# Patient Record
Sex: MALE | Race: WHITE | NOT HISPANIC OR LATINO | Employment: PART TIME | URBAN - METROPOLITAN AREA
[De-identification: names, ages, dates, MRNs, and addresses within clinical notes are randomized per-mention and may not be internally consistent; named-entity substitution may affect disease eponyms.]

---

## 2018-02-14 ENCOUNTER — OFFICE VISIT (OUTPATIENT)
Dept: UROLOGY | Facility: CLINIC | Age: 73
End: 2018-02-14
Payer: COMMERCIAL

## 2018-02-14 VITALS
HEIGHT: 69 IN | BODY MASS INDEX: 27.55 KG/M2 | DIASTOLIC BLOOD PRESSURE: 76 MMHG | WEIGHT: 186 LBS | SYSTOLIC BLOOD PRESSURE: 112 MMHG | HEART RATE: 78 BPM

## 2018-02-14 DIAGNOSIS — N52.9 ERECTILE DYSFUNCTION, UNSPECIFIED ERECTILE DYSFUNCTION TYPE: Primary | ICD-10-CM

## 2018-02-14 DIAGNOSIS — N40.0 BENIGN PROSTATIC HYPERPLASIA, UNSPECIFIED WHETHER LOWER URINARY TRACT SYMPTOMS PRESENT: ICD-10-CM

## 2018-02-14 LAB
POST-VOID RESIDUAL VOLUME, ML POC: 12 ML
SL AMB  POCT GLUCOSE, UA: NORMAL
SL AMB LEUKOCYTE ESTERASE,UA: NORMAL
SL AMB POCT BLOOD,UA: NORMAL
SL AMB POCT CLARITY,UA: CLEAR
SL AMB POCT COLOR,UA: YELLOW
SL AMB POCT KETONES,UA: NORMAL
SL AMB POCT NITRITE,UA: NORMAL
SL AMB POCT PH,UA: 7
SL AMB POCT URINE PROTEIN: NORMAL

## 2018-02-14 PROCEDURE — 81002 URINALYSIS NONAUTO W/O SCOPE: CPT | Performed by: PHYSICIAN ASSISTANT

## 2018-02-14 PROCEDURE — 99203 OFFICE O/P NEW LOW 30 MIN: CPT | Performed by: PHYSICIAN ASSISTANT

## 2018-02-14 RX ORDER — MAG HYDROX/ALUMINUM HYD/SIMETH 400-400-40
450 SUSPENSION, ORAL (FINAL DOSE FORM) ORAL DAILY
COMMUNITY
End: 2019-02-26

## 2018-02-14 RX ORDER — IBUPROFEN 800 MG/1
1 TABLET ORAL AS NEEDED
COMMUNITY
Start: 2016-04-19 | End: 2019-08-14 | Stop reason: ALTCHOICE

## 2018-02-14 RX ORDER — SIMVASTATIN 40 MG
TABLET ORAL DAILY
COMMUNITY
Start: 2018-02-06 | End: 2019-03-05 | Stop reason: SDUPTHER

## 2018-02-14 RX ORDER — PERINDOPRIL ERBUMINE 4 MG/1
2 TABLET ORAL AS NEEDED
COMMUNITY
Start: 2015-10-12 | End: 2019-02-26

## 2018-02-14 RX ORDER — NAPROXEN 250 MG/1
200 TABLET ORAL DAILY
COMMUNITY
End: 2020-05-21

## 2018-02-14 NOTE — PROGRESS NOTES
UROLOGY NEW PATIENT NOTE   Admission Date: (Not on file)    Patient Identifiers: Yoana Merino (MRN: [id number])  Urology, Jorden Bourne PA-C  Consults  Date of Service: 2/14/2018      History of Present Illness:     Yoana Merino is a 71 y o  old with a history of erectile dysfunction  He has been using Viagra for several years successfully  He only used 1/2 of 100 mg  More recently he began having trouble achieving and maintaining an erection  Urine is clear  No voiding complaints  No prior urologic history  No family history  Bladder PVR 12ml  Past Medical, Past Surgical History:     Past Medical History:   Diagnosis Date    High cholesterol    :    Past Surgical History:   Procedure Laterality Date    BACK SURGERY      INCISION AND DRAINAGE ABSCESS / HEMATOMA OF BURSA / KNEE / THIGH      SHOULDER SURGERY Right    :    Medications, Allergies:     Current Outpatient Prescriptions:     aspirin 81 MG tablet, Take 81 mg by mouth 3 (three) times a week, Disp: , Rfl:     ibuprofen (MOTRIN) 800 mg tablet, Take 1 tablet by mouth every 6 (six) hours, Disp: , Rfl:     naproxen (NAPROSYN) 250 mg tablet, Take 200 mg by mouth daily, Disp: , Rfl:     perindopril (ACEON) 4 MG tablet, Take 2 mg by mouth as needed, Disp: , Rfl:     Saw [place] 450 MG CAPS, Take 450 mg by mouth daily, Disp: , Rfl:     simvastatin (ZOCOR) 40 mg tablet, daily, Disp: , Rfl:     VITAMIN E PO, Take 500 Units by mouth, Disp: , Rfl:     Allergies:  No Known Allergies:    Social and Family History:   Social History:   Social History   Substance Use Topics    Smoking status: Former Smoker    Smokeless tobacco: Never Used    Alcohol use Yes      Comment: 5 Drinks a week         History   Smoking Status    Former Smoker   Smokeless Tobacco    Never Used       Family History:  Family History   Problem Relation Age of Onset    Heart disease Mother     Heart disease Father    :     Review of Systems:     General: Fever, chills, or night sweats: negative  Cardiac: Negative for chest pain  Pulmonary: Negative for shortness of breath  Gastrointestinal: Abdominal pain negative  Nausea, vomiting, or diarrhea negative,  Genitourinary: See HPI above  Patient does not have hematuria  All other systems queried were negative  Physical Exam:   General: Patient is pleasant and in NAD  Awake and alert  /76 (BP Location: Right arm, Patient Position: Sitting, Cuff Size: Adult)   Pulse 78   Ht 5' 9" (1 753 m)   Wt 84 4 kg (186 lb)   BMI 27 47 kg/m²   Cardiac: Peripheral edema: negative  Pulmonary: Non-labored breathing  Abdomen: Soft, non-tender, non-distended  No surgical scars  No masses, tenderness, hernias noted  Genitourinary: Negative CVA tenderness, negative suprapubic tenderness  (Male): Penis circumcised, phallus normal, meatus patent  Testicles descended into scrotum bilaterally the left testicle is atrophic without masses nor tenderness  No inguinal hernias bilaterally  RADHA: Prostate is enlarged at 40 grams  Smooth without nodules      Labs:   No results found for: HGB, HCT, WBC, PLT]    No results found for: NA, K, CL, CO2, BUN, CREATININE, CALCIUM, GLUCOSE]    Imaging:   I personally reviewed the images and report of the following studies, and reviewed them with the patient: None    ASSESSMENT:     1  Erectile dysfunction  2  BPH      PLAN:   - try 100 mg viagra  - check testosterone and PSA  - follow up in 3 weeks    Thank you for allowing me to participate in this patients care  Please do not hesitate to call with any additional questions    Judith Mena PA-C

## 2018-02-28 ENCOUNTER — OFFICE VISIT (OUTPATIENT)
Dept: UROLOGY | Facility: CLINIC | Age: 73
End: 2018-02-28
Payer: COMMERCIAL

## 2018-02-28 VITALS
BODY MASS INDEX: 27.55 KG/M2 | DIASTOLIC BLOOD PRESSURE: 75 MMHG | HEIGHT: 69 IN | WEIGHT: 186 LBS | SYSTOLIC BLOOD PRESSURE: 117 MMHG

## 2018-02-28 DIAGNOSIS — N40.1 BPH WITH OBSTRUCTION/LOWER URINARY TRACT SYMPTOMS: ICD-10-CM

## 2018-02-28 DIAGNOSIS — N13.8 BPH WITH OBSTRUCTION/LOWER URINARY TRACT SYMPTOMS: ICD-10-CM

## 2018-02-28 DIAGNOSIS — N52.9 ERECTILE DYSFUNCTION, UNSPECIFIED ERECTILE DYSFUNCTION TYPE: Primary | ICD-10-CM

## 2018-02-28 LAB
SL AMB  POCT GLUCOSE, UA: NORMAL
SL AMB LEUKOCYTE ESTERASE,UA: NORMAL
SL AMB POCT BLOOD,UA: NORMAL
SL AMB POCT CLARITY,UA: CLEAR
SL AMB POCT COLOR,UA: YELLOW
SL AMB POCT KETONES,UA: NORMAL
SL AMB POCT NITRITE,UA: NORMAL
SL AMB POCT PH,UA: 5
SL AMB POCT URINE PROTEIN: NORMAL

## 2018-02-28 PROCEDURE — 81002 URINALYSIS NONAUTO W/O SCOPE: CPT | Performed by: PHYSICIAN ASSISTANT

## 2018-02-28 PROCEDURE — 99213 OFFICE O/P EST LOW 20 MIN: CPT | Performed by: PHYSICIAN ASSISTANT

## 2018-02-28 NOTE — PROGRESS NOTES
UROLOGY PROGRESS NOTE   Patient Identifiers: Abhay Stewart (MRN [id number])  Date of Service: 2/28/2018    Subjective:   71year old male hx of BPH and ED  He uses prn Viagra  PSA 1 29  Testosterone 510  Urine clear  Patient has  no complaints  Objective:     VITALS:    Vitals:    02/28/18 1441   BP: 117/75           LABS:  No results found for: HGB, HCT, WBC, PLT]    No results found for: NA, K, CL, CO2, BUN, CREATININE, CALCIUM, GLUCOSE]    INPATIENT MEDS:    Current Outpatient Prescriptions:     aspirin 81 MG tablet, Take 81 mg by mouth 3 (three) times a week, Disp: , Rfl:     ibuprofen (MOTRIN) 800 mg tablet, Take 1 tablet by mouth every 6 (six) hours, Disp: , Rfl:     naproxen (NAPROSYN) 250 mg tablet, Take 200 mg by mouth daily, Disp: , Rfl:     Saw [place] 450 MG CAPS, Take 450 mg by mouth daily, Disp: , Rfl:     simvastatin (ZOCOR) 40 mg tablet, daily, Disp: , Rfl:     VITAMIN E PO, Take 500 Units by mouth, Disp: , Rfl:     perindopril (ACEON) 4 MG tablet, Take 2 mg by mouth as needed, Disp: , Rfl:       Physical Exam:   /75 (BP Location: Left arm, Patient Position: Sitting, Cuff Size: Adult)   Ht 5' 9" (1 753 m)   Wt 84 4 kg (186 lb)   BMI 27 47 kg/m²   GEN: no acute distress    RESP: breathing comfortably with no accessory muscle use    ABD: soft, non-tender, non-distended    EXT: no significant peripheral edema     RADIOLOGY:   none     Assessment:   1   BPH  2  ED     Plan:   - one year psa prior  - prn Viagra  -  -  -

## 2019-01-18 ENCOUNTER — OFFICE VISIT (OUTPATIENT)
Dept: UROLOGY | Facility: CLINIC | Age: 74
End: 2019-01-18
Payer: COMMERCIAL

## 2019-01-18 VITALS
BODY MASS INDEX: 27.4 KG/M2 | HEIGHT: 69 IN | DIASTOLIC BLOOD PRESSURE: 64 MMHG | WEIGHT: 185 LBS | SYSTOLIC BLOOD PRESSURE: 122 MMHG | HEART RATE: 104 BPM

## 2019-01-18 DIAGNOSIS — N52.9 ERECTILE DYSFUNCTION, UNSPECIFIED ERECTILE DYSFUNCTION TYPE: Primary | ICD-10-CM

## 2019-01-18 PROCEDURE — 99213 OFFICE O/P EST LOW 20 MIN: CPT | Performed by: PHYSICIAN ASSISTANT

## 2019-01-18 RX ORDER — EZETIMIBE AND SIMVASTATIN 10; 40 MG/1; MG/1
1 TABLET ORAL EVERY OTHER DAY
COMMUNITY
End: 2021-01-07

## 2019-01-18 RX ORDER — SILDENAFIL 100 MG/1
100 TABLET, FILM COATED ORAL DAILY PRN
Qty: 10 TABLET | Refills: 0 | Status: SHIPPED | OUTPATIENT
Start: 2019-01-18

## 2019-01-18 NOTE — PROGRESS NOTES
UROLOGY PROGRESS NOTE   Patient Identifiers: Bri Rodriguez (MRN [id number])  Date of Service: 1/18/2019    Subjective:     80-year-old man with a history of erectile dysfunction  He has been using Viagra for several years successfully  His testosterone when checked was over 500  He was due for follow-up appointment in February but called come in sooner  He states the Viagra has stopped working for the last 2 months  He is buying a generic version online  He has not started any new medications  He does not smoke  He has not been tested for sleep apnea      Patient has    See above      Objective:     VITALS:    Vitals:    01/18/19 1433   BP: 122/64   Pulse: 104           LABS:  No results found for: HGB, HCT, WBC, PLT]    No results found for: NA, K, CL, CO2, BUN, CREATININE, CALCIUM, GLUCOSE]        INPATIENT MEDS:    Current Outpatient Prescriptions:     aspirin 81 MG tablet, Take 81 mg by mouth 3 (three) times a week, Disp: , Rfl:     ezetimibe-simvastatin (VYTORIN) 10-40 mg per tablet, Take 1 tablet by mouth, Disp: , Rfl:     ibuprofen (MOTRIN) 800 mg tablet, Take 1 tablet by mouth every 6 (six) hours, Disp: , Rfl:     perindopril (ACEON) 4 MG tablet, Take 2 mg by mouth as needed, Disp: , Rfl:     VITAMIN E PO, Take 500 Units by mouth, Disp: , Rfl:     naproxen (NAPROSYN) 250 mg tablet, Take 200 mg by mouth daily, Disp: , Rfl:     Saw [place] 450 MG CAPS, Take 450 mg by mouth daily, Disp: , Rfl:     sildenafil (VIAGRA) 100 mg tablet, Take 1 tablet (100 mg total) by mouth daily as needed for erectile dysfunction, Disp: 10 tablet, Rfl: 0    simvastatin (ZOCOR) 40 mg tablet, daily, Disp: , Rfl:       Physical Exam:   /64 (BP Location: Left arm, Patient Position: Sitting, Cuff Size: Adult)   Pulse 104   Ht 5' 9" (1 753 m)   Wt 83 9 kg (185 lb)   BMI 27 32 kg/m²   GEN: no acute distress    RESP: breathing comfortably with no accessory muscle use    ABD: soft, non-tender, non-distended INCISION:    EXT: no significant peripheral edema       RADIOLOGY:    none     Assessment:    1  Erectile dysfunction   2   BPH     Plan:   -  I recommended he consider getting a sleep study  To be arranged by his family doctor  - I gave him a prescription for brand name Viagra 100 mg and suggested he try at least 1 for comparison  - he has an appointment at the end of February which he will keep with a PSA prior to visit  -

## 2019-02-26 ENCOUNTER — HOSPITAL ENCOUNTER (OUTPATIENT)
Dept: RADIOLOGY | Facility: HOSPITAL | Age: 74
Discharge: HOME | End: 2019-02-26
Payer: COMMERCIAL

## 2019-02-26 VITALS
HEIGHT: 69 IN | HEART RATE: 85 BPM | WEIGHT: 186 LBS | SYSTOLIC BLOOD PRESSURE: 125 MMHG | DIASTOLIC BLOOD PRESSURE: 85 MMHG | BODY MASS INDEX: 27.55 KG/M2

## 2019-02-26 DIAGNOSIS — S82.034A CLOSED NONDISPLACED TRANSVERSE FRACTURE OF RIGHT PATELLA, INITIAL ENCOUNTER: Primary | ICD-10-CM

## 2019-02-26 DIAGNOSIS — M25.561 ACUTE PAIN OF RIGHT KNEE: ICD-10-CM

## 2019-02-26 PROCEDURE — 99203 OFFICE O/P NEW LOW 30 MIN: CPT | Performed by: PHYSICIAN ASSISTANT

## 2019-02-26 PROCEDURE — 73562 X-RAY EXAM OF KNEE 3: CPT

## 2019-02-26 RX ORDER — MULTIVIT-MIN/IRON/FOLIC ACID/K 18-600-40
CAPSULE ORAL DAILY PRN
COMMUNITY

## 2019-02-26 RX ORDER — PHENOL 1.4 %
600 AEROSOL, SPRAY (ML) MUCOUS MEMBRANE 2 TIMES DAILY WITH MEALS
COMMUNITY
End: 2020-04-09

## 2019-02-27 NOTE — PROGRESS NOTES
Assessment/Plan   Diagnoses and all orders for this visit:    Acute pain of right knee  -     XR knee 3 vw right non injury; Future    Patella fracture, non-displaced       -     Immobilizer fitted and dispensed today       -     Wear at all times       -     Crutches refused       -     Ice, NSAIDs       -     Continue daily baby aspirin       -     Follow up with sports medicine in 1 week  Subjective   Patient ID: Hernan Short is a 68 y o  male  Vitals:    02/26/19 1903   BP: 125/85   Pulse: 80     72yo male comes in for an evaluation of his right knee  He was injured when he fell on the ice a week ago  He struck his knee directly on the ground  Since then, he has been having pain in the superior patellar area  His pain is mild and he has been walking on it  The pain is dull in character, mild in severity, pain does not radiate and is not associated with numbness  The following portions of the patient's history were reviewed and updated as appropriate: allergies, current medications, past family history, past medical history, past social history, past surgical history and problem list     Review of Systems  Ortho Exam  Past Medical History:   Diagnosis Date    High cholesterol      Past Surgical History:   Procedure Laterality Date    BACK SURGERY      INCISION AND DRAINAGE ABSCESS / HEMATOMA OF BURSA / [place] / [place] Right      Family History   Problem Relation Age of Onset    Heart disease Mother     Heart disease Father      Social History     Occupational History    Occupation:    Tobacco Use    Smoking status: Former Smoker    Smokeless tobacco: Never Used   Substance and Sexual Activity    Alcohol use: Yes     Comment: 5 Drinks a week    Drug use: No    Sexual activity: Not on file       Review of Systems   Constitutional: Negative  HENT: Negative  Eyes: Negative  Respiratory: Negative  Cardiovascular: Negative      Gastrointestinal: Negative  Endocrine: Negative  Genitourinary: Negative  Musculoskeletal: As below      Allergic/Immunologic: Negative  Neurological: Negative  Hematological: Negative  Psychiatric/Behavioral: Negative  Objective   Physical Exam    · Constitutional: Awake, Alert, Oriented  · Eyes: EOMI  · Psych: Mood and affect appropriate  · Heart: regular rate and rhythm  · Lungs: No audible wheezing  · Abdomen: soft  · Lymph: no lymphedema   right Knee:  - Appearance   Swelling: mild anterior, no discoloration, no deformity, no ecchymosis and no erythema  - Effusion   trace  - Palpation   + superior Patella tenderness  No tenderness about the medial / lateral joint line, patellar tendon, MCL, LCL, hamstrings, or medial / lateral tibial plateau   - ROM   Extension: 0 and Flexion: 90  - Special Tests   He is able to perform a straight leg raise against gravity  - Motor   limited by pain  - NVI distally    I have personally reviewed pertinent films in PACS and my interpretation is Nondisplaced, horizontal fracture at the superior pole of the patella

## 2019-03-05 ENCOUNTER — APPOINTMENT (OUTPATIENT)
Dept: RADIOLOGY | Facility: OTHER | Age: 74
End: 2019-03-05
Payer: COMMERCIAL

## 2019-03-05 VITALS
HEIGHT: 69 IN | DIASTOLIC BLOOD PRESSURE: 86 MMHG | SYSTOLIC BLOOD PRESSURE: 118 MMHG | HEART RATE: 70 BPM | BODY MASS INDEX: 27.47 KG/M2

## 2019-03-05 DIAGNOSIS — T14.8XXA FRACTURE: Primary | ICD-10-CM

## 2019-03-05 DIAGNOSIS — S82.034A CLOSED NONDISPLACED TRANSVERSE FRACTURE OF RIGHT PATELLA, INITIAL ENCOUNTER: ICD-10-CM

## 2019-03-05 DIAGNOSIS — T14.8XXA FRACTURE: ICD-10-CM

## 2019-03-05 PROCEDURE — 73564 X-RAY EXAM KNEE 4 OR MORE: CPT

## 2019-03-05 PROCEDURE — 99213 OFFICE O/P EST LOW 20 MIN: CPT | Performed by: FAMILY MEDICINE

## 2019-03-05 NOTE — PATIENT INSTRUCTIONS
Patient's history, exam, and radiographic findings are consistent with nondisplaced transverse upper pole patellar fracture  Patient is just under 2 weeks out of injury  At this time, advised patient to continue to wear his extension immobilizer brace until next evaluation, but may weight bear as tolerated  At next visit in 1 week, will repeat knee xray to evaluate for potential displacement  If patient is further improved at this time, may consider transitioning to a range of motion knee brace  Advised against bending knee until clearance, possibly at next visit  Explained that we don't want to rehab too soon to avoid further injury  Patient voiced understanding and agreement to above plan; no further questions or concerns voiced

## 2019-03-05 NOTE — PROGRESS NOTES
1  Fracture  XR knee 4+ vw right injury   2  Closed nondisplaced transverse fracture of right patella, initial encounter       Orders Placed This Encounter   Procedures    XR knee 4+ vw right injury        Imaging Studies (I personally reviewed images in PACS and report):  3/5/19 right knee xray: previously described nondisplaced transverse upper pole patellar fracture without new displacement    Past diagnostics:  2/26/19 right knee xray: acute nondisplaced transverse upper pole patellar fracture    IMPRESSION:  Nondisplaced transverse upper pole patellar fracture  Date of injury: 2/21/19  Initial evaluation of sports medicine: 3/5/19  Date of immobilization: 2/26/19  Follow up since injury: 1 week 5 days      Return in about 1 week (around 3/12/2019) for repeat xrays at this time  Patient Instructions   Patient's history, exam, and radiographic findings are consistent with nondisplaced transverse upper pole patellar fracture  Patient is just under 2 weeks out of injury  At this time, advised patient to continue to wear his extension immobilizer brace until next evaluation, but may weight bear as tolerated  At next visit in 1 week, will repeat knee xray to evaluate for potential displacement  If patient is further improved at this time, may consider transitioning to a range of motion knee brace  Advised against bending knee until clearance, possibly at next visit  Explained that we don't want to rehab too soon to avoid further injury  Patient voiced understanding and agreement to above plan; no further questions or concerns voiced  CHIEF COMPLAINT:  Right knee pain  Nondisplaced transverse upper pole patellar fracture    HPI:  Karen Almodovar is a 68 y o  male  who presents for       Visit 3/5/19  Patient is a pleasant 67 yo M presenting for initial evaluation of right knee pain s/p direct impact fall on the ice approx 2/19/19   Patient was evaluated by Reji Mistry 2/26/19 and diagnosed with nondisplaced transverse upper pole patellar fracture  He was placed in a knee immobilizer and instructed to be non weight bearing using crutches  Today, patient notes he has been using the knee immobilizer as instructed, but has been weight bearing with the use of a cane  Notes  1/10 pain with standing, 3-4/10 with walking, and 6/10 with going up steps  Describes pain as pressure, soreness over anterior aspect of knee  Denies numbness, tingling, weakness, or new falls  History of right bursa removed about 15 years ago      Review of Systems   Constitutional: Negative for chills and fever  HENT: Negative for congestion, postnasal drip and sore throat  Respiratory: Negative for cough, choking, shortness of breath and wheezing  Cardiovascular: Negative for chest pain and palpitations  Gastrointestinal: Negative for abdominal pain, diarrhea, nausea and vomiting  Genitourinary: Negative for decreased urine volume and difficulty urinating  Neurological: Negative for dizziness and light-headedness  Following history reviewed and update:    Past Medical History:   Diagnosis Date    High cholesterol      Past Surgical History:   Procedure Laterality Date    BACK SURGERY      HERNIA REPAIR      INCISION AND DRAINAGE ABSCESS / HEMATOMA OF BURSA / KNEE / THIGH      SHOULDER SURGERY Right      Social History   Social History     Substance and Sexual Activity   Alcohol Use Yes    Comment: 5 Drinks a week     Social History     Substance and Sexual Activity   Drug Use No     Social History     Tobacco Use   Smoking Status Former Smoker   Smokeless Tobacco Never Used     Family History   Problem Relation Age of Onset    Heart disease Mother     Heart disease Father     Heart disease Brother      Allergies   Allergen Reactions    Cephalosporins      Other reaction(s):  Other (See Comments)  rash          Physical Exam  /86   Pulse 70   Ht 5' 9" (1 753 m)   BMI 27 47 kg/m² Constitutional:  see vital signs  Gen: well-developed, normocephalic/atraumatic, well-groomed  Eyes: No inflammation or discharge of conjunctiva or lids; sclera clear   Pharynx: no inflammation, lesion, or mass of lips  Neck: supple, no masses, non-distended  MSK: no inflammation, lesion, mass, or clubbing of nails and digits except for other than mentioned below  SKIN: no visible rashes or skin lesions  Pulmonary/Chest: Effort normal  No respiratory distress     NEURO: cranial nerves grossly intact  PSYCH:  Alert and oriented to person, place, and time; recent and remote memory intact; mood normal, no depression, anxiety, or agitation, judgment and insight good and intact     Ortho Exam  RIGHT KNEE:  Erythema: no  Swelling: no  Increased Warmth: no  Tenderness: mild over proximal patella  Flexion: intact  Extension: intact  Patellar Apprehension: negative  Patellar Grind Crain's: negative  Lachman's: negative  Drawer: negative  Varus laxity: negative  Valgus laxity: negative  Didier: negative     Procedures

## 2019-03-11 NOTE — TELEPHONE ENCOUNTER
Call from patient  Phone# [phone]  Flex    Pt called to change his appointment to another day after 4:30pm  His wife gets out of work at 4:15p  Pt has no other way to get to appointment  Emailed Harinder Souza to schedule Lyft ride, pt agreed  Needs [place] where he can fully extend his leg

## 2019-03-14 ENCOUNTER — OFFICE VISIT (OUTPATIENT)
Dept: OBGYN CLINIC | Facility: OTHER | Age: 74
End: 2019-03-14
Payer: COMMERCIAL

## 2019-03-14 ENCOUNTER — APPOINTMENT (OUTPATIENT)
Dept: RADIOLOGY | Facility: OTHER | Age: 74
End: 2019-03-14
Payer: COMMERCIAL

## 2019-03-14 VITALS
BODY MASS INDEX: 26.66 KG/M2 | HEART RATE: 81 BPM | SYSTOLIC BLOOD PRESSURE: 126 MMHG | DIASTOLIC BLOOD PRESSURE: 85 MMHG | HEIGHT: 69 IN | WEIGHT: 180 LBS

## 2019-03-14 DIAGNOSIS — S82.034D CLOSED NONDISPLACED TRANSVERSE FRACTURE OF RIGHT PATELLA WITH ROUTINE HEALING, SUBSEQUENT ENCOUNTER: Primary | ICD-10-CM

## 2019-03-14 DIAGNOSIS — T14.8XXA FRACTURE: ICD-10-CM

## 2019-03-14 PROCEDURE — 99213 OFFICE O/P EST LOW 20 MIN: CPT | Performed by: FAMILY MEDICINE

## 2019-03-14 PROCEDURE — 73564 X-RAY EXAM KNEE 4 OR MORE: CPT

## 2019-03-14 NOTE — PATIENT INSTRUCTIONS
Instructed patient that he will be transitioned into a TROM brace today and no flexion past 20 degrees in brace  He will progressively be able to flex his knee after his next follow up visit and repeat x rays to evaluate potential displacement of the fracture  Recommended against driving at this time  Patient was in agreement with this treatment plan and all questions were answered  I recommend vitamin D 800-1000 IUs (international units) and Calcium 1500mg per day to help promote fracture healing  Calcium pills can lead to constipation and I recommend increasing calcium through the diet via dairy such as yogurt, cheese, or milk as tolerated  Vitamin D is usually taken by mouth in pill form over the counter  Sometimes vitamin D levels are too low and physicians may consider checking a blood test to see if you require extra Vitamin D for catch-up dosing if you are already low

## 2019-03-14 NOTE — PROGRESS NOTES
1  Closed nondisplaced transverse fracture of right patella with routine healing, subsequent encounter  T-Rom  Post Op Knee Brace   2  Fracture  XR knee 4+ vw right injury     Orders Placed This Encounter   Procedures    T-Rom  Post Op Knee Brace    XR knee 4+ vw right injury        Imaging Studies (I personally reviewed images in PACS and report):  X-ray right knee 3/14/19: Nondisplaced transverse upper pole patellar fracture without further displacement    Past diagnostics:  2/26/19 right knee xray: acute nondisplaced transverse upper pole patellar fracture  3/5/19 right knee xray: previously described nondisplaced transverse upper pole patellar fracture without new displacement    IMPRESSION:  Nondisplaced transverse upper pole patellar fracture  Date of injury: 2/21/19  Initial evaluation of sports medicine: 3/5/19  Date of immobilization: 2/26/19  Follow up since injury: 3 weeks      Return in about 1 week (around 3/21/2019) for Recheck of right knee with repeat x rays at this time  Patient Instructions   Instructed patient that he will be transitioned into a TROM brace today and no flexion past 20 degrees in brace  He will progressively be able to flex his knee after his next follow up visit and repeat x rays to evaluate potential displacement of the fracture  Recommended against driving at this time  Patient was in agreement with this treatment plan and all questions were answered  I recommend vitamin D 800-1000 IUs (international units) and Calcium 1500mg per day to help promote fracture healing  Calcium pills can lead to constipation and I recommend increasing calcium through the diet via dairy such as yogurt, cheese, or milk as tolerated  Vitamin D is usually taken by mouth in pill form over the counter  Sometimes vitamin D levels are too low and physicians may consider checking a blood test to see if you require extra Vitamin D for catch-up dosing if you are already low                CHIEF COMPLAINT:  Follow-up Nondisplaced transverse upper pole patellar fracture    HPI:  Delores Martinez is a 68 y o  male  who presents for       Visit 3/5/19  Patient is a pleasant 67 yo M presenting for initial evaluation of right knee pain s/p direct impact fall on the ice approx 2/19/19  Patient was evaluated by Zechariah Whyte 2/26/19 and diagnosed with nondisplaced transverse upper pole patellar fracture  He was placed in a knee immobilizer and instructed to be non weight bearing using crutches  Today, patient notes he has been using the knee immobilizer as instructed, but has been weight bearing with the use of a cane  Notes  1/10 pain with standing, 3-4/10 with walking, and 6/10 with going up steps  Describes pain as pressure, soreness over anterior aspect of knee  Denies numbness, tingling, weakness, or new falls  History of right bursa removed about 15 years ago    Visit 3/14/19  69 y/o male who presents today for a follow up visit for his right knee  Patient has been treating conservatively for a nondisplaced patellar fracture  Today, patient reports that he is doing about 75% better in terms of his pain/symptoms  He has been complaint with no flexion of his knee and the use of axillary crutches  He continues to note mild tenderness, "soreness" over the anterior aspect of his knee  He has removed his straight leg knee brace occasionally at home and is able to fully extend his leg without any difficulty  He does avoid flexing his knee during this time  Denies numbness and tingling, fevers or chills  Review of Systems   Constitutional: Negative for chills and fever  HENT: Negative for congestion, hearing loss, postnasal drip and sore throat  Eyes: Negative for visual disturbance  Respiratory: Negative for cough, choking, shortness of breath and wheezing  Cardiovascular: Negative for chest pain and palpitations  Gastrointestinal: Negative for abdominal pain, diarrhea, nausea and vomiting  Genitourinary: Negative for decreased urine volume, difficulty urinating and dysuria  Neurological: Negative for dizziness, weakness, light-headedness and numbness  Psychiatric/Behavioral: Negative for suicidal ideas  Following history reviewed and update:    Past Medical History:   Diagnosis Date    High cholesterol      Past Surgical History:   Procedure Laterality Date    BACK SURGERY      HERNIA REPAIR      INCISION AND DRAINAGE ABSCESS / HEMATOMA OF BURSA / KNEE / THIGH      SHOULDER SURGERY Right      Social History   Social History     Substance and Sexual Activity   Alcohol Use Yes    Comment: 5 Drinks a week     Social History     Substance and Sexual Activity   Drug Use No     Social History     Tobacco Use   Smoking Status Former Smoker   Smokeless Tobacco Never Used     Family History   Problem Relation Age of Onset    Heart disease Mother     Heart disease Father     Heart disease Brother      Allergies   Allergen Reactions    Cephalosporins      Rash  *PT STATES THAT HE DOES NOT HAVE ANY ALLERGIES          Physical Exam  /85   Pulse 81   Ht 5' 9" (1 753 m)   Wt 81 6 kg (180 lb) Comment: verbal, scale is broken  BMI 26 58 kg/m²     Constitutional:  see vital signs  Gen: well-developed, normocephalic/atraumatic, well-groomed  Eyes: No inflammation or discharge of conjunctiva or lids; sclera clear   Pharynx: no inflammation, lesion, or mass of lips  Neck: supple, no masses, non-distended  MSK: no inflammation, lesion, mass, or clubbing of nails and digits except for other than mentioned below  SKIN: no visible rashes or skin lesions  Pulmonary/Chest: Effort normal  No respiratory distress     NEURO: cranial nerves grossly intact  PSYCH:  Alert and oriented to person, place, and time; recent and remote memory intact; mood normal, no depression, anxiety, or agitation, judgment and insight good and intact     Ortho Exam  RIGHT KNEE:  Erythema: no  Swelling: no  Increased Warmth: no  Tenderness: +mild over proximal patella  Flexion:  Active 20° stiffness  Extension:   Extensor mechanism intact  No deficit    Patellar Apprehension: negative  Patellar Grind Crain's: negative  Lachman's: negative  Drawer: negative  Varus laxity: negative  Valgus laxity: negative    Procedures        Scribe Attestation    I,:   Duke Tena am acting as a scribe while in the presence of the attending physician :        I,:   Alida Viera DO personally performed the services described in this documentation    as scribed in my presence :

## 2019-03-21 ENCOUNTER — OFFICE VISIT (OUTPATIENT)
Dept: OBGYN CLINIC | Facility: OTHER | Age: 74
End: 2019-03-21
Payer: COMMERCIAL

## 2019-03-21 ENCOUNTER — APPOINTMENT (OUTPATIENT)
Dept: RADIOLOGY | Facility: OTHER | Age: 74
End: 2019-03-21
Payer: COMMERCIAL

## 2019-03-21 VITALS
BODY MASS INDEX: 26.58 KG/M2 | SYSTOLIC BLOOD PRESSURE: 115 MMHG | DIASTOLIC BLOOD PRESSURE: 77 MMHG | HEIGHT: 69 IN | HEART RATE: 62 BPM

## 2019-03-21 DIAGNOSIS — T14.8XXA FRACTURE: ICD-10-CM

## 2019-03-21 DIAGNOSIS — S82.034D CLOSED NONDISPLACED TRANSVERSE FRACTURE OF RIGHT PATELLA WITH ROUTINE HEALING, SUBSEQUENT ENCOUNTER: Primary | ICD-10-CM

## 2019-03-21 PROCEDURE — 73564 X-RAY EXAM KNEE 4 OR MORE: CPT

## 2019-03-21 PROCEDURE — 99213 OFFICE O/P EST LOW 20 MIN: CPT | Performed by: FAMILY MEDICINE

## 2019-03-21 NOTE — PROGRESS NOTES
1  Closed nondisplaced transverse fracture of right patella with routine healing, subsequent encounter  [facility]   2  Fracture  XR knee 4+ vw right injury     Orders Placed This Encounter   Procedures    XR knee 4+ vw right injury    [facility]        Imaging Studies (I personally reviewed images in PACS and report):  X-ray right knee 03/21/2019:  Nondisplaced transverse upper pole patellar fracture without further displacement  X-ray right knee 3/14/19: Nondisplaced transverse upper pole patellar fracture without further displacement    Past diagnostics:  2/26/19 right knee xray: acute nondisplaced transverse upper pole patellar fracture  3/5/19 right knee xray: previously described nondisplaced transverse upper pole patellar fracture without new displacement    IMPRESSION:  Nondisplaced transverse upper pole patellar fracture  Date of injury: 2/19/19  Initial evaluation of sports medicine: 3/5/19  Date of immobilization: 2/26/19  Follow up since injury: 4 weeks 2 days      Return in about 1 week (around 3/28/2019)  Patient Instructions   Explained the patient that on his x-ray today I do not see any significant displacement of his transverse patellar fracture  I recommended as a precaution to keep his knee in full extension is T ROM brace at 0° while walking  I have recommend starting active and active assisted range of motion with physical therapy and have placed referral   He will follow up again within 1 week for repeat x-ray to evaluate for alignment and fracture stability  CHIEF COMPLAINT:  Follow-up Nondisplaced transverse upper pole patellar fracture    HPI:  Mena Mccallum is a 68 y o  male  who presents for       Visit 3/5/19  Patient is a pleasant 67 yo M presenting for initial evaluation of right knee pain s/p direct impact fall on the ice approx 2/19/19   Patient was evaluated by Bianca Herbert 2/26/19 and diagnosed with nondisplaced transverse upper pole patellar fracture  He was placed in a knee immobilizer and instructed to be non weight bearing using crutches  Today, patient notes he has been using the knee immobilizer as instructed, but has been weight bearing with the use of a cane  Notes  1/10 pain with standing, 3-4/10 with walking, and 6/10 with going up steps  Describes pain as pressure, soreness over anterior aspect of knee  Denies numbness, tingling, weakness, or new falls  History of right bursa removed about 15 years ago    Visit 3/14/19  69 y/o male who presents today for a follow up visit for his right knee  Patient has been treating conservatively for a nondisplaced patellar fracture  Today, patient reports that he is doing about 75% better in terms of his pain/symptoms  He has been complaint with no flexion of his knee and the use of axillary crutches  He continues to note mild tenderness, "soreness" over the anterior aspect of his knee  He has removed his straight leg knee brace occasionally at home and is able to fully extend his leg without any difficulty  He does avoid flexing his knee during this time  Denies numbness and tingling, fevers or chills  03/21/2019: Follow-up right transverse upper pole patellar fracture:  Stable  No pain  He has been walking with his T ROM brace without pain  Over all he feels 80+ percent improved  He denies any new injury  Review of Systems   Constitutional: Negative for chills, fever and unexpected weight change  HENT: Negative for hearing loss, nosebleeds and sore throat  Eyes: Negative for pain, redness and visual disturbance  Respiratory: Negative for cough, shortness of breath and wheezing  Cardiovascular: Negative for chest pain, palpitations and leg swelling  Gastrointestinal: Negative for abdominal distention, nausea and vomiting  Endocrine: Negative for polydipsia and polyuria  Genitourinary: Negative for dysuria and hematuria  Skin: Negative for rash and wound     Neurological: Negative for dizziness, numbness and headaches  Psychiatric/Behavioral: Negative for decreased concentration and suicidal ideas  Following history reviewed and update:    Past Medical History:   Diagnosis Date    High cholesterol      Past Surgical History:   Procedure Laterality Date    BACK SURGERY      HERNIA REPAIR      INCISION AND DRAINAGE ABSCESS / HEMATOMA OF BURSA / KNEE / THIGH      SHOULDER SURGERY Right      Social History   Social History     Substance and Sexual Activity   Alcohol Use Yes    Comment: 5 Drinks a week     Social History     Substance and Sexual Activity   Drug Use No     Social History     Tobacco Use   Smoking Status Former Smoker   Smokeless Tobacco Never Used     Family History   Problem Relation Age of Onset    Heart disease Mother     Heart disease Father     Heart disease Brother      Allergies   Allergen Reactions    Cephalosporins      Rash  *PT STATES THAT HE DOES NOT HAVE ANY ALLERGIES          Physical Exam  /77   Pulse 62   Ht 5' 9" (1 753 m)   BMI 26 58 kg/m²   Constitutional:  see vital signs  Gen: well-developed, normocephalic/atraumatic, well-groomed  Eyes: No inflammation or discharge of conjunctiva or lids; sclera clear   Pharynx: no inflammation, lesion, or mass of lips  Neck: supple, no masses, non-distended  MSK: no inflammation, lesion, mass, or clubbing of nails and digits except for other than mentioned below  SKIN: no visible rashes or skin lesions  Pulmonary/Chest: Effort normal  No respiratory distress     NEURO: cranial nerves grossly intact  PSYCH:  Alert and oriented to person, place, and time; recent and remote memory intact; mood normal, no depression, anxiety, or agitation, judgment and insight good and intact      Ortho Exam  RIGHT KNEE:  Erythema: no  Swelling: no  Increased Warmth: no  Tenderness: none ; no tenderness of patella  Flexion:   Active flexion 90° without pain  Extension: intact; extensor mechanism intact with active extension full  Lachman's: negative  Drawer: negative  Varus laxity: negative  Valgus laxity: negative  Didier: negative      Procedures

## 2019-03-21 NOTE — PATIENT INSTRUCTIONS
Explained the patient that on his x-ray today I do not see any significant displacement of his transverse patellar fracture  I recommended as a precaution to keep his knee in full extension is T ROM brace at 0° while walking  I have recommend starting active and active assisted range of motion with physical therapy and have placed referral   He will follow up again within 1 week for repeat x-ray to evaluate for alignment and fracture stability

## 2019-03-27 ENCOUNTER — EVALUATION (OUTPATIENT)
Dept: PHYSICAL THERAPY | Facility: REHABILITATION | Age: 74
End: 2019-03-27
Payer: COMMERCIAL

## 2019-03-27 DIAGNOSIS — S82.034D CLOSED NONDISPLACED TRANSVERSE FRACTURE OF RIGHT PATELLA WITH ROUTINE HEALING, SUBSEQUENT ENCOUNTER: Primary | ICD-10-CM

## 2019-03-27 PROCEDURE — 97110 THERAPEUTIC EXERCISES: CPT | Performed by: PHYSICAL THERAPIST

## 2019-03-27 PROCEDURE — 97161 PT EVAL LOW COMPLEX 20 MIN: CPT | Performed by: PHYSICAL THERAPIST

## 2019-03-27 NOTE — PROGRESS NOTES
PT Evaluation     Today's date: 3/27/2019  Patient name: Cristobal Sanches  : 1945  MRN: [id number]  Referring provider: Spencer Iniguez  Dx:   Encounter Diagnosis     ICD-10-CM    1  Closed nondisplaced transverse fracture of right patella with routine healing, subsequent encounter S82 [id number]                   Assessment  Assessment details: Patient presents with pain, decreased knee ROM, decreased LE strength, and decreased function secondary to R patellar fracture  Patient would benefit from skilled PT intervention to address these issues and to maximize function  Thank you for the referral   Impairments: abnormal or restricted ROM, activity intolerance, impaired balance, impaired physical strength, lacks appropriate home exercise program and pain with function  Understanding of Dx/Px/POC: good   Prognosis: good    Goals  Short Term:  Pt will report decreased levels of pain by at least 2 subjective ratings in 4 weeks  Pt will demonstrate improved ROM by at least 10 degrees in 4 weeks  Pt will demonstrate improved strength by 1/2 grade MMT in 4 weeks  Long Term:   Pt will be independent in their HEP in 8 weeks  Pt will demonstrate improved FOTO, > 64  Pt will be independent with all ADL's  Pt will return to work at prior level of function    Plan  Plan details: Patient was educated in [address] [place] [address] and Plan of Care  All questions were answered to pt's satisfaction      Patient would benefit from: skilled physical therapy  Planned modality interventions: cryotherapy  Planned therapy interventions: manual therapy, joint mobilization, balance/weight bearing training, neuromuscular re-education, patient education, flexibility, therapeutic activities, therapeutic exercise, gait training and home exercise program  Frequency: 2x week  Duration in weeks: 6  Plan of Care beginning date: 3/27/2019  Plan of Care expiration date: 2019  Treatment plan discussed with: patient        Subjective Evaluation    History of Present Illness  Mechanism of injury: Pt reports slipping and falling on the ice in February and landed on his R knee  Pt states he had some persistent pain and saw MD a few days later  Pt has radiographs, which showed patella fracture  Pt was placed in leg immobilizer and saw Ortho a week later  Pt had radiographs again to confirm the fracture  Pt has been seeing them weekly to make sure the fracture is healing correctly  Pt is now referred for PT  Pt reports pain/difficulty with tub transfers, steps (step to pattern with railing and SPC; greater difficulty descending), sit to stand (uses SPC to help push self up out of chair)  Pt would like to resume working as a  for [employer]  Pain  At best pain rating: 3  At worst pain ratin  Location: R knee      Diagnostic Tests  X-ray: abnormal  Patient Goals  Patient goals for therapy: decreased pain, increased strength, increased motion, return to work and independence with ADLs/IADLs          Objective     Active Range of Motion     Right Knee   Flexion: 125 degrees   Extension: 10 degrees     Passive Range of Motion     Right Knee   Flexion: 132 degrees   Extension: 5 degrees     Mobility   Patellar Mobility:     Right Knee   Hypomobile: superior and inferior     Strength/Myotome Testing     Right Hip   Planes of Motion   Flexion: 4  Abduction: 4    Right Knee   Flexion: 4  Extension: 4-  Quadriceps contraction: good    General Comments:      Knee Comments  Decreased HS and gastroc flexibility noted  Pt instructed on proper use of SPC, L UE use  Flowsheet Rows      Most Recent Value   PT/OT G-Codes   Current Score  52   Projected Score  64          Precautions: OA, lumbar surgery    Daily Treatment Diary     Manual              R knee ext PROM             Gentle sup/inf patellar JM's                                                            Exercise Diary              Bike             SLR x 3             Clamshells             LAQ HS curls             HR/TR             Biodex:  LOS             Standing gastroc stretch                                                                                                                                                                             Modalities              CP PRN

## 2019-03-29 ENCOUNTER — APPOINTMENT (OUTPATIENT)
Dept: RADIOLOGY | Facility: OTHER | Age: 74
End: 2019-03-29
Payer: COMMERCIAL

## 2019-03-29 ENCOUNTER — OFFICE VISIT (OUTPATIENT)
Dept: OBGYN CLINIC | Facility: OTHER | Age: 74
End: 2019-03-29
Payer: COMMERCIAL

## 2019-03-29 VITALS
HEIGHT: 69 IN | HEART RATE: 73 BPM | BODY MASS INDEX: 27.85 KG/M2 | DIASTOLIC BLOOD PRESSURE: 72 MMHG | SYSTOLIC BLOOD PRESSURE: 107 MMHG | WEIGHT: 188 LBS

## 2019-03-29 DIAGNOSIS — S82.034D CLOSED NONDISPLACED TRANSVERSE FRACTURE OF RIGHT PATELLA WITH ROUTINE HEALING, SUBSEQUENT ENCOUNTER: ICD-10-CM

## 2019-03-29 DIAGNOSIS — S82.034D CLOSED NONDISPLACED TRANSVERSE FRACTURE OF RIGHT PATELLA WITH ROUTINE HEALING, SUBSEQUENT ENCOUNTER: Primary | ICD-10-CM

## 2019-03-29 PROCEDURE — 73564 X-RAY EXAM KNEE 4 OR MORE: CPT

## 2019-03-29 PROCEDURE — 99213 OFFICE O/P EST LOW 20 MIN: CPT | Performed by: FAMILY MEDICINE

## 2019-03-29 NOTE — PATIENT INSTRUCTIONS
Continue straight leg knee brace and walking in brace locked full extension  Continue range of motion exercise active and active assisted as tolerated  Continue straight leg raises  Do not start any bending resistance exercises

## 2019-03-29 NOTE — PROGRESS NOTES
1  Closed nondisplaced transverse fracture of right patella with routine healing, subsequent encounter  XR knee 4+ vw right injury     Orders Placed This Encounter   Procedures    XR knee 4+ vw right injury        Imaging Studies (I personally reviewed images in PACS and report):  X-ray right knee 03/29/2019:  Nondisplaced transverse upper pole patellar fracture no evidence of further displacement  Past diagnostics:  2/26/19 right knee xray: acute nondisplaced transverse upper pole patellar fracture  3/5/19 right knee xray: previously described nondisplaced transverse upper pole patellar fracture without new displacement    IMPRESSION:  Nondisplaced transverse upper pole patellar fracture  Date of injury: 2/19/19  Initial evaluation of sports medicine: 3/5/19  Date of immobilization: 2/26/19  Follow up since injury: 5 weeks 3 days      Return in about 2 weeks (around 4/12/2019)  Patient Instructions   Continue straight leg knee brace and walking in brace locked full extension  Continue range of motion exercise active and active assisted as tolerated  Continue straight leg raises  Do not start any bending resistance exercises            CHIEF COMPLAINT:  Follow-up Nondisplaced transverse upper pole patellar fracture    HPI:  Ilia Melissa is a 68 y o  male  who presents for       Visit 3/5/19  Patient is a pleasant 67 yo M presenting for initial evaluation of right knee pain s/p direct impact fall on the ice approx 2/19/19  Patient was evaluated by Chetan Momin 2/26/19 and diagnosed with nondisplaced transverse upper pole patellar fracture  He was placed in a knee immobilizer and instructed to be non weight bearing using crutches  Today, patient notes he has been using the knee immobilizer as instructed, but has been weight bearing with the use of a cane  Notes  1/10 pain with standing, 3-4/10 with walking, and 6/10 with going up steps  Describes pain as pressure, soreness over anterior aspect of knee  Denies numbness, tingling, weakness, or new falls  History of right bursa removed about 15 years ago    Visit 3/14/19  67 y/o male who presents today for a follow up visit for his right knee  Patient has been treating conservatively for a nondisplaced patellar fracture  Today, patient reports that he is doing about 75% better in terms of his pain/symptoms  He has been complaint with no flexion of his knee and the use of axillary crutches  He continues to note mild tenderness, "soreness" over the anterior aspect of his knee  He has removed his straight leg knee brace occasionally at home and is able to fully extend his leg without any difficulty  He does avoid flexing his knee during this time  Denies numbness and tingling, fevers or chills  03/21/2019: Follow-up right transverse upper pole patellar fracture:  Stable  No pain  He has been walking with his T ROM brace without pain  Over all he feels 80+ percent improved  He denies any new injury  03/29/2019: Follow-up right transverse upper pole patellar fracture:  Patient has begun range-of-motion exercises with some soreness in his knee  He has also seen physical therapist and started active assisted range of motion  Denies any pain  He is ambulating with his brace without difficulty  He also occasionally ambulates without his brace with no pain  Review of Systems   Constitutional: Negative for chills and fever  Neurological: Negative for weakness           Following history reviewed and update:    Past Medical History:   Diagnosis Date    Arthritis     High cholesterol      Past Surgical History:   Procedure Laterality Date    BACK SURGERY      HERNIA REPAIR      INCISION AND DRAINAGE ABSCESS / HEMATOMA OF BURSA / KNEE / THIGH      SHOULDER SURGERY Right      Social History   Social History     Substance and Sexual Activity   Alcohol Use Yes    Comment: 5 Drinks a week     Social History     Substance and Sexual Activity   Drug Use No     Social History     Tobacco Use   Smoking Status Former Smoker   Smokeless Tobacco Never Used     Family History   Problem Relation Age of Onset    Heart disease Mother     Heart disease Father     Heart disease Brother      Allergies   Allergen Reactions    Cephalosporins      Rash  *PT STATES THAT HE DOES NOT HAVE ANY ALLERGIES          Physical Exam  /72 (BP Location: Left arm, Patient Position: Sitting, Cuff Size: Standard)   Pulse 73   Ht 5' 9" (1 753 m)   Wt 85 3 kg (188 lb)   BMI 27 76 kg/m²   Constitutional:  see vital signs  Gen: well-developed, normocephalic/atraumatic, well-groomed  Eyes: No inflammation or discharge of conjunctiva or lids; sclera clear   Pharynx: no inflammation, lesion, or mass of lips  Neck: supple, no masses, non-distended  MSK: no inflammation, lesion, mass, or clubbing of nails and digits except for other than mentioned below  SKIN: no visible rashes or skin lesions  Pulmonary/Chest: Effort normal  No respiratory distress     NEURO: cranial nerves grossly intact  PSYCH:  Alert and oriented to person, place, and time; recent and remote memory intact; mood normal, no depression, anxiety, or agitation, judgment and insight good and intact      Ortho Exam  RIGHT KNEE:  Erythema: no  Swelling: no  Increased Warmth: no  Tenderness: none  Flexion:   110° active  Extension: intact full  Patellar Displacement:  None  Patellar Tilt:  None  Patellar Apprehension: negative  Patellar Grind Crain's: negative  Lachman's: negative  Drawer:   Varus laxity: negative  Valgus laxity: negative  Didier:        Procedures

## 2019-04-02 ENCOUNTER — OFFICE VISIT (OUTPATIENT)
Dept: PHYSICAL THERAPY | Facility: REHABILITATION | Age: 74
End: 2019-04-02
Payer: COMMERCIAL

## 2019-04-02 DIAGNOSIS — S82.034D CLOSED NONDISPLACED TRANSVERSE FRACTURE OF RIGHT PATELLA WITH ROUTINE HEALING, SUBSEQUENT ENCOUNTER: Primary | ICD-10-CM

## 2019-04-02 PROCEDURE — 97140 MANUAL THERAPY 1/> REGIONS: CPT | Performed by: PHYSICAL THERAPIST

## 2019-04-02 PROCEDURE — 97112 NEUROMUSCULAR REEDUCATION: CPT | Performed by: PHYSICAL THERAPIST

## 2019-04-02 PROCEDURE — 97110 THERAPEUTIC EXERCISES: CPT | Performed by: PHYSICAL THERAPIST

## 2019-04-04 ENCOUNTER — OFFICE VISIT (OUTPATIENT)
Dept: PHYSICAL THERAPY | Facility: REHABILITATION | Age: 74
End: 2019-04-04
Payer: COMMERCIAL

## 2019-04-04 DIAGNOSIS — S82.034D CLOSED NONDISPLACED TRANSVERSE FRACTURE OF RIGHT PATELLA WITH ROUTINE HEALING, SUBSEQUENT ENCOUNTER: Primary | ICD-10-CM

## 2019-04-04 PROCEDURE — 97110 THERAPEUTIC EXERCISES: CPT | Performed by: PHYSICAL THERAPIST

## 2019-04-04 PROCEDURE — 97140 MANUAL THERAPY 1/> REGIONS: CPT | Performed by: PHYSICAL THERAPIST

## 2019-04-04 PROCEDURE — 97112 NEUROMUSCULAR REEDUCATION: CPT | Performed by: PHYSICAL THERAPIST

## 2019-04-09 ENCOUNTER — OFFICE VISIT (OUTPATIENT)
Dept: PHYSICAL THERAPY | Facility: REHABILITATION | Age: 74
End: 2019-04-09
Payer: COMMERCIAL

## 2019-04-09 DIAGNOSIS — S82.034D CLOSED NONDISPLACED TRANSVERSE FRACTURE OF RIGHT PATELLA WITH ROUTINE HEALING, SUBSEQUENT ENCOUNTER: Primary | ICD-10-CM

## 2019-04-09 PROCEDURE — 97110 THERAPEUTIC EXERCISES: CPT | Performed by: PHYSICAL THERAPIST

## 2019-04-09 PROCEDURE — 97112 NEUROMUSCULAR REEDUCATION: CPT | Performed by: PHYSICAL THERAPIST

## 2019-04-11 ENCOUNTER — APPOINTMENT (OUTPATIENT)
Dept: PHYSICAL THERAPY | Facility: REHABILITATION | Age: 74
End: 2019-04-11
Payer: COMMERCIAL

## 2019-04-11 ENCOUNTER — OFFICE VISIT (OUTPATIENT)
Dept: OBGYN CLINIC | Facility: OTHER | Age: 74
End: 2019-04-11
Payer: COMMERCIAL

## 2019-04-11 ENCOUNTER — APPOINTMENT (OUTPATIENT)
Dept: RADIOLOGY | Facility: OTHER | Age: 74
End: 2019-04-11
Payer: COMMERCIAL

## 2019-04-11 VITALS
BODY MASS INDEX: 28.14 KG/M2 | HEART RATE: 71 BPM | DIASTOLIC BLOOD PRESSURE: 77 MMHG | HEIGHT: 69 IN | WEIGHT: 190 LBS | SYSTOLIC BLOOD PRESSURE: 118 MMHG

## 2019-04-11 DIAGNOSIS — S82.034D CLOSED NONDISPLACED TRANSVERSE FRACTURE OF RIGHT PATELLA WITH ROUTINE HEALING, SUBSEQUENT ENCOUNTER: ICD-10-CM

## 2019-04-11 DIAGNOSIS — S82.034D CLOSED NONDISPLACED TRANSVERSE FRACTURE OF RIGHT PATELLA WITH ROUTINE HEALING, SUBSEQUENT ENCOUNTER: Primary | ICD-10-CM

## 2019-04-11 PROCEDURE — 73564 X-RAY EXAM KNEE 4 OR MORE: CPT

## 2019-04-11 PROCEDURE — 99213 OFFICE O/P EST LOW 20 MIN: CPT | Performed by: FAMILY MEDICINE

## 2019-04-17 ENCOUNTER — APPOINTMENT (OUTPATIENT)
Dept: PHYSICAL THERAPY | Facility: REHABILITATION | Age: 74
End: 2019-04-17
Payer: COMMERCIAL

## 2019-04-18 ENCOUNTER — TELEPHONE (OUTPATIENT)
Dept: PAIN MEDICINE | Facility: MEDICAL CENTER | Age: 74
End: 2019-04-18

## 2019-04-25 ENCOUNTER — OFFICE VISIT (OUTPATIENT)
Dept: OBGYN CLINIC | Facility: OTHER | Age: 74
End: 2019-04-25
Payer: COMMERCIAL

## 2019-04-25 ENCOUNTER — APPOINTMENT (OUTPATIENT)
Dept: RADIOLOGY | Facility: OTHER | Age: 74
End: 2019-04-25
Payer: COMMERCIAL

## 2019-04-25 VITALS
BODY MASS INDEX: 28.06 KG/M2 | SYSTOLIC BLOOD PRESSURE: 126 MMHG | HEIGHT: 69 IN | DIASTOLIC BLOOD PRESSURE: 77 MMHG | HEART RATE: 69 BPM

## 2019-04-25 DIAGNOSIS — S82.034D CLOSED NONDISPLACED TRANSVERSE FRACTURE OF RIGHT PATELLA WITH ROUTINE HEALING, SUBSEQUENT ENCOUNTER: ICD-10-CM

## 2019-04-25 DIAGNOSIS — S82.034D CLOSED NONDISPLACED TRANSVERSE FRACTURE OF RIGHT PATELLA WITH ROUTINE HEALING, SUBSEQUENT ENCOUNTER: Primary | ICD-10-CM

## 2019-04-25 PROCEDURE — 73564 X-RAY EXAM KNEE 4 OR MORE: CPT

## 2019-04-25 PROCEDURE — 99213 OFFICE O/P EST LOW 20 MIN: CPT | Performed by: FAMILY MEDICINE

## 2019-05-09 ENCOUNTER — OFFICE VISIT (OUTPATIENT)
Dept: OBGYN CLINIC | Facility: OTHER | Age: 74
End: 2019-05-09
Payer: COMMERCIAL

## 2019-05-09 ENCOUNTER — APPOINTMENT (OUTPATIENT)
Dept: RADIOLOGY | Facility: OTHER | Age: 74
End: 2019-05-09
Payer: COMMERCIAL

## 2019-05-09 VITALS
HEART RATE: 120 BPM | SYSTOLIC BLOOD PRESSURE: 134 MMHG | HEIGHT: 69 IN | DIASTOLIC BLOOD PRESSURE: 80 MMHG | BODY MASS INDEX: 28.06 KG/M2

## 2019-05-09 DIAGNOSIS — S82.034D CLOSED NONDISPLACED TRANSVERSE FRACTURE OF RIGHT PATELLA WITH ROUTINE HEALING, SUBSEQUENT ENCOUNTER: ICD-10-CM

## 2019-05-09 DIAGNOSIS — Z09 FOLLOW UP: ICD-10-CM

## 2019-05-09 DIAGNOSIS — Z09 FOLLOW UP: Primary | ICD-10-CM

## 2019-05-09 PROCEDURE — 99213 OFFICE O/P EST LOW 20 MIN: CPT | Performed by: ORTHOPAEDIC SURGERY

## 2019-05-09 PROCEDURE — 73562 X-RAY EXAM OF KNEE 3: CPT

## 2019-05-10 ENCOUNTER — TELEPHONE (OUTPATIENT)
Dept: OBGYN CLINIC | Facility: HOSPITAL | Age: 74
End: 2019-05-10

## 2019-05-10 DIAGNOSIS — S82.034D CLOSED NONDISPLACED TRANSVERSE FRACTURE OF RIGHT PATELLA WITH ROUTINE HEALING, SUBSEQUENT ENCOUNTER: Primary | ICD-10-CM

## 2019-07-10 ENCOUNTER — TELEPHONE (OUTPATIENT)
Dept: OBGYN CLINIC | Facility: HOSPITAL | Age: 74
End: 2019-07-10

## 2019-08-05 ENCOUNTER — TELEPHONE (OUTPATIENT)
Dept: OBGYN CLINIC | Facility: OTHER | Age: 74
End: 2019-08-05

## 2019-08-05 NOTE — TELEPHONE ENCOUNTER
Spoke to Dipika Lombardo in regards to his appointment on 8/12/2019  He stated that this appointment is not needed at this time  He says he has some discomfort but its nothing he can not live without  Dr Conklin Cure had told him it may take 6 months to a full year to heal    He asked if we could just cancel this appointment and he will call back to schedule with DR BELTRE if he needs anything

## 2019-08-14 ENCOUNTER — OFFICE VISIT (OUTPATIENT)
Dept: INTERNAL MEDICINE CLINIC | Facility: CLINIC | Age: 74
End: 2019-08-14
Payer: COMMERCIAL

## 2019-08-14 VITALS
HEART RATE: 83 BPM | BODY MASS INDEX: 26.07 KG/M2 | SYSTOLIC BLOOD PRESSURE: 100 MMHG | TEMPERATURE: 97.3 F | DIASTOLIC BLOOD PRESSURE: 70 MMHG | HEIGHT: 69 IN | WEIGHT: 176 LBS

## 2019-08-14 DIAGNOSIS — E66.3 OVERWEIGHT (BMI 25.0-29.9): ICD-10-CM

## 2019-08-14 DIAGNOSIS — K21.9 GASTROESOPHAGEAL REFLUX DISEASE WITHOUT ESOPHAGITIS: Primary | ICD-10-CM

## 2019-08-14 DIAGNOSIS — Z13.1 SCREENING FOR DIABETES MELLITUS: ICD-10-CM

## 2019-08-14 DIAGNOSIS — E78.5 DYSLIPIDEMIA: ICD-10-CM

## 2019-08-14 DIAGNOSIS — M19.91 PRIMARY OSTEOARTHRITIS, UNSPECIFIED SITE: ICD-10-CM

## 2019-08-14 PROCEDURE — 1036F TOBACCO NON-USER: CPT | Performed by: INTERNAL MEDICINE

## 2019-08-14 PROCEDURE — 99204 OFFICE O/P NEW MOD 45 MIN: CPT | Performed by: INTERNAL MEDICINE

## 2019-08-14 PROCEDURE — 3008F BODY MASS INDEX DOCD: CPT | Performed by: INTERNAL MEDICINE

## 2019-08-14 PROCEDURE — 1160F RVW MEDS BY RX/DR IN RCRD: CPT | Performed by: INTERNAL MEDICINE

## 2019-08-14 RX ORDER — IBUPROFEN 200 MG
200 TABLET ORAL AS NEEDED
COMMUNITY
End: 2020-05-30 | Stop reason: HOSPADM

## 2019-08-14 RX ORDER — FAMOTIDINE 20 MG/1
20 TABLET, FILM COATED ORAL 2 TIMES DAILY
Qty: 20 TABLET | Refills: 2 | Status: SHIPPED | OUTPATIENT
Start: 2019-08-14 | End: 2020-04-09

## 2019-08-14 RX ORDER — LATANOPROST 50 UG/ML
SOLUTION/ DROPS OPHTHALMIC 3 TIMES WEEKLY
Refills: 4 | COMMUNITY
Start: 2019-08-09

## 2019-08-14 NOTE — ASSESSMENT & PLAN NOTE
Discussed healthier diet, reducing concentrated sugars, fat rich foods,  Fried foods  And incorporating some physical exercise into his daily routine,  as discussed and BMI follow-up plan  Patient already says he goes to the gym 3 times a week

## 2019-08-14 NOTE — PROGRESS NOTES
Assessment/Plan:    Gastroesophageal reflux disease without esophagitis  Chiara Bertrand reports experiencing some intense irritation with regurgitating food particles 1-2 hours after eating  These episodes have been happening intermittently for the past 3 weeks  He also reports one instance when  He drank cold iced tea fast and experienced a fluttering sensation in the epigastrium  This has never happened afterwards  On another occasion, he drank denies cold beer after working outside in the heat  And about 1 hour later he had vomiting with mild nausea, the vomitus contained only liquid which he thinks it was the beer  He denies experiencing any heart burn, cough at night when he lays down vomiting blood, vomiting coffee-ground like material, blood in stool, black stool, abdominal pain  He does not have the sensation of food getting stuck in his chest   He does not have any halitosis  We will try Pepcid twice a day for now  Patient has been educated about calling the office in about a week to report any worsening or improvement  In his symptoms  Chiara Bertrand has been taking ibuprofen and naproxen almost daily for awhile for osteoarthritis  We have discussed how these medications can affect the stomach and  Kidneys  we have discussed  Cutting down on these medications and starting Tylenol arthritis as needed for pain  We have also discussed avoiding alcohol and caffeine, sodas, acidic foods and fruit  Discussed with him and instructed him to call the office if he ever experiences any blood in stool, black stool, vomiting blood or vomiting coffee-ground like material   The differential includes GERD, achalasia, gastroparesis, esophageal pathology such as strictures, webs, carcinoma, patterson's  Will assess progression or improvement of symptoms on the pepcid within the next week  Patient will likely need to see a gastroenterologist   Chiara Bertrand states understanding of all discussed and agrees with the plan  Dyslipidemia  Will check lipid panel at next visit  Continue Vytorin  Overweight (BMI 25 0-29  9)  Discussed healthier diet, reducing concentrated sugars, fat rich foods,  Fried foods  And incorporating some physical exercise into his daily routine,  as discussed and BMI follow-up plan  Patient already says he goes to the gym 3 times a week  Primary osteoarthritis    Francisca Mccarthy says he has osteoarthritis in multiple joints and he has sustained right patellar fracture earlier this year  For his pain he takes naproxen almost daily and as needed ibuprofen  We have discussed the side effects of this medication on the stomach and kidneys and I have advised the patient to reduce the intake of these NSAIDs  Fredo Berry to start taking Tylenol arthritis as needed and cut down on the naproxen and ibuprofen  Patient states understanding and says he will try  Diagnoses and all orders for this visit:    Gastroesophageal reflux disease without esophagitis  -     famotidine (PEPCID) 20 mg tablet; Take 1 tablet (20 mg total) by mouth 2 (two) times a day for 30 days    Dyslipidemia  -     Comprehensive metabolic panel; Future  -     Lipid Panel with Direct LDL reflex; Future    Screening for diabetes mellitus  -     Hemoglobin A1C; Future    Overweight (BMI 25 0-29 9)  -     CBC and differential; Future  -     TSH, 3rd generation; Future    Primary osteoarthritis, unspecified site    BMI 25 0-25 9,adult    Other orders  -     latanoprost (XALATAN) 0 005 % ophthalmic solution; 1 DROP OT EACH EYE AT BEDTIME  -     ibuprofen (MOTRIN) 200 mg tablet; Take 200 mg by mouth as needed        Subjective:      Patient ID: Sophia Jameson is a 68 y o  male  HPI     Francisca Mccarthy presents to our office today to establish care with us as his primary care team   His chronic conditions include osteoarthritis, dyslipidemia, recent right patellar fracture    Francisca Mccarthy reports experiencing some intense irritation with regurgitating food particles 1-2 hours after eating  These episodes have been happening intermittently for the past 3 weeks  He also reports one instance when  He drank cold iced tea fast and experienced a fluttering sensation in the epigastrium  This has never happened afterwards  On another occasion, he drank denies cold beer after working outside in the heat  And about 1 hour later he had vomiting with mild nausea, the vomitus contained only liquid which he thinks it was the beer  He denies experiencing any heart burn, cough at night when he lays down vomiting blood, vomiting coffee-ground like material, blood in stool, black stool, abdominal pain  He does not have the sensation of food getting stuck in his chest   He does not have any halitosis  We will try Pepcid twice a day for now  Patient has been educated about calling the office in about a week to report any worsening or improvement  In his symptoms  [place] has been taking ibuprofen and naproxen almost daily for awhile for osteoarthritis  He drinks alcohol occasionally and he does drink caffeine on a daily basis  The following portions of the patient's history were reviewed and updated as appropriate: allergies, current medications, past family history, past medical history, past social history, past surgical history and problem list     Review of Systems   Constitutional: Negative for appetite change, chills, diaphoresis, fatigue, fever and unexpected weight change  HENT: Negative for congestion, facial swelling, rhinorrhea, sinus pressure, sinus pain and sore throat  Respiratory: Negative for apnea, cough, chest tightness, shortness of breath, wheezing and stridor  Cardiovascular: Negative for chest pain, palpitations and leg swelling     Gastrointestinal: Negative for abdominal distention, abdominal pain, anal bleeding, blood in stool, constipation, diarrhea, nausea (had mild nausea once with the one episode of vomiting) and vomiting (had one episode)  Reports some "fluttering" feeling in epigastrium once after drinking cold ice tea, reports eructation after eating   Genitourinary: Negative for dysuria, frequency and urgency  Musculoskeletal: Positive for arthralgias and back pain  Negative for joint swelling and myalgias  Skin: Negative for pallor and rash  Neurological: Negative for dizziness, weakness, light-headedness and headaches  Objective:      /70 (BP Location: Left arm, Patient Position: Sitting, Cuff Size: Standard)   Pulse 83   Temp (!) 97 3 °F (36 3 °C)   Ht 5' 9" (1 753 m)   Wt 79 8 kg (176 lb)   BMI 25 99 kg/m²        Physical Exam   Constitutional: He is oriented to person, place, and time  He appears well-developed and well-nourished  No distress  HENT:   Head: Normocephalic and atraumatic  Nose: Nose normal    Mouth/Throat: Oropharynx is clear and moist  No oropharyngeal exudate  Eyes: Pupils are equal, round, and reactive to light  Conjunctivae and EOM are normal  No scleral icterus  Neck: Normal range of motion  Neck supple  Cardiovascular: Normal rate, regular rhythm, normal heart sounds and intact distal pulses  No murmur heard  Pulmonary/Chest: Effort normal and breath sounds normal  No respiratory distress  He has no wheezes  He has no rales  He exhibits no tenderness  Abdominal: Soft  Bowel sounds are normal  He exhibits no distension and no mass  There is no tenderness  There is no rebound and no guarding  Musculoskeletal: Normal range of motion  He exhibits no edema, tenderness or deformity  Neurological: He is alert and oriented to person, place, and time  No cranial nerve deficit or sensory deficit  He exhibits normal muscle tone  Skin: Skin is warm and dry  No rash noted  He is not diaphoretic  No pallor  Psychiatric: He has a normal mood and affect  His behavior is normal  Judgment and thought content normal    Nursing note and vitals reviewed  BMI Counseling:  Body mass index is 25 99 kg/m²  Discussed the patient's BMI with him  The BMI is above average  BMI counseling and education was provided to the patient  Nutrition recommendations include reducing portion sizes, decreasing overall calorie intake, 3-5 servings of fruits/vegetables daily, reducing fast food intake, consuming healthier snacks, decreasing soda and/or juice intake, moderation in carbohydrate intake, increasing intake of lean protein, reducing intake of saturated fat and trans fat and reducing intake of cholesterol  Exercise recommendations include exercising 3-5 times per week and strength training exercises

## 2019-08-14 NOTE — ASSESSMENT & PLAN NOTE
Marty Juarez reports experiencing some intense irritation with regurgitating food particles 1-2 hours after eating  These episodes have been happening intermittently for the past 3 weeks  He also reports one instance when  He drank cold iced tea fast and experienced a fluttering sensation in the epigastrium  This has never happened afterwards  On another occasion, he drank denies cold beer after working outside in the heat  And about 1 hour later he had vomiting with mild nausea, the vomitus contained only liquid which he thinks it was the beer  He denies experiencing any heart burn, cough at night when he lays down vomiting blood, vomiting coffee-ground like material, blood in stool, black stool, abdominal pain  He does not have the sensation of food getting stuck in his chest   He does not have any halitosis  We will try Pepcid twice a day for now  Patient has been educated about calling the office in about a week to report any worsening or improvement  In his symptoms  Marty Jaurez has been taking ibuprofen and naproxen almost daily for awhile for osteoarthritis  We have discussed how these medications can affect the stomach and  Kidneys  we have discussed  Cutting down on these medications and starting Tylenol arthritis as needed for pain  We have also discussed avoiding alcohol and caffeine, sodas, acidic foods and fruit  Discussed with him and instructed him to call the office if he ever experiences any blood in stool, black stool, vomiting blood or vomiting coffee-ground like material   The differential includes GERD, achalasia, gastroparesis, esophageal pathology such as strictures, webs, carcinoma, patterson's  Will assess progression or improvement of symptoms on the pepcid within the next week  Patient will likely need to see a gastroenterologist   Marty Juarez states understanding of all discussed and agrees with the plan

## 2019-08-14 NOTE — ASSESSMENT & PLAN NOTE
Lisa Kelly says he has osteoarthritis in multiple joints and he has sustained right patellar fracture earlier this year  For his pain he takes naproxen almost daily and as needed ibuprofen  We have discussed the side effects of this medication on the stomach and kidneys and I have advised the patient to reduce the intake of these NSAIDs  Pat Rings to start taking Tylenol arthritis as needed and cut down on the naproxen and ibuprofen  Patient states understanding and says he will try

## 2019-08-14 NOTE — PATIENT INSTRUCTIONS
Weight Management   AMBULATORY CARE:   Why it is important to manage your weight:  Being overweight increases your risk of health conditions such as heart disease, high blood pressure, type 2 diabetes, and certain types of cancer  It can also increase your risk for osteoarthritis, sleep apnea, and other respiratory problems  Aim for a slow, steady weight loss  Even a small amount of weight loss can lower your risk of health problems  How to lose weight safely:  A safe and healthy way to lose weight is to eat fewer calories and get regular exercise  You can lose up about 1 pound a week by decreasing the number of calories you eat by 500 calories each day  You can decrease calories by eating smaller portion sizes or by cutting out high-calorie foods  Read labels to find out how many calories are in the foods you eat  You can also burn calories with exercise such as walking, swimming, or biking  You will be more likely to keep weight off if you make these changes part of your lifestyle  Healthy meal plan for weight management:  A healthy meal plan includes a variety of foods, contains fewer calories, and helps you stay healthy  A healthy meal plan includes the following:  · Eat whole-grain foods more often  A healthy meal plan should contain fiber  Fiber is the part of grains, fruits, and vegetables that is not broken down by your body  Whole-grain foods are healthy and provide extra fiber in your diet  Some examples of whole-grain foods are whole-wheat breads and pastas, oatmeal, brown rice, and bulgur  · Eat a variety of vegetables every day  Include dark, leafy greens such as spinach, kale, marcel greens, and mustard greens  Eat yellow and orange vegetables such as carrots, sweet potatoes, and winter squash  · Eat a variety of fruits every day  Choose fresh or canned fruit (canned in its own juice or light syrup) instead of juice  Fruit juice has very little or no fiber  · Eat low-fat dairy foods  Drink fat-free (skim) milk or 1% milk  Eat fat-free yogurt and low-fat cottage cheese  Try low-fat cheeses such as mozzarella and other reduced-fat cheeses  · Choose meat and other protein foods that are low in fat  Choose beans or other legumes such as split peas or lentils  Choose fish, skinless poultry (chicken or turkey), or lean cuts of red meat (beef or pork)  Before you cook meat or poultry, cut off any visible fat  · Use less fat and oil  Try baking foods instead of frying them  Add less fat, such as margarine, sour cream, regular salad dressing and mayonnaise to foods  Eat fewer high-fat foods  Some examples of high-fat foods include french fries, doughnuts, ice cream, and cakes  · Eat fewer sweets  Limit foods and drinks that are high in sugar  This includes candy, cookies, regular soda, and sweetened drinks  Ways to decrease calories:   · Eat smaller portions  ¨ Use a small plate with smaller servings  ¨ Do not eat second helpings  ¨ When you eat at a restaurant, ask for a box and place half of your meal in the box before you eat  ¨ Share an entrée with someone else  · Replace high-calorie snacks with healthy, low-calorie snacks  ¨ Choose fresh fruit, vegetables, fat-free rice cakes, or air-popped popcorn instead of potato chips, nuts, or chocolate  ¨ Choose water or calorie-free drinks instead of soda or sweetened drinks  · Eat regular meals  Skipping meals can lead to overeating later in the day  Eat a healthy snack in place of a meal if you do not have time to eat a regular meal      · Do not shop for groceries when you are hungry  You may be more likely to make unhealthy food choices  Take a grocery list of healthy foods and shop after you have eaten  Exercise:  Exercise at least 30 minutes per day on most days of the week  Some examples of exercise include walking, biking, dancing, and swimming   You can also fit in more physical activity by taking the stairs instead of the elevator or parking farther away from stores  Ask your healthcare provider about the best exercise plan for you  Other things to consider as you try to lose weight:   · Be aware of situations that may give you the urge to overeat, such as eating while watching television  Find ways to avoid these situations  For example, read a book, go for a walk, or do crafts  · Meet with a weight loss support group or friends who are also trying to lose weight  This may help you stay motivated to continue working on your weight loss goals  © 2017 [address] [facility] Information is for End User's use only and may not be sold, redistributed or otherwise used for commercial purposes  All illustrations and images included in CareNotes® are the copyrighted property of [employer]  or [employer]  The above information is an  only  It is not intended as medical advice for individual conditions or treatments  Talk to your doctor, nurse or pharmacist before following any medical regimen to see if it is safe and effective for you  Low Fat Diet   AMBULATORY CARE:   A low-fat diet  is an eating plan that is low in total fat, unhealthy fat, and cholesterol  You may need to follow a low-fat diet if you have trouble digesting or absorbing fat  You may also need to follow this diet if you have high cholesterol  You can also lower your cholesterol by increasing the amount of fiber in your diet  Soluble fiber is a type of fiber that helps to decrease cholesterol levels  Different types of fat in food:   · Limit unhealthy fats  A diet that is high in cholesterol, saturated fat, and trans fat may cause unhealthy cholesterol levels  Unhealthy cholesterol levels increase your risk of heart disease  ¨ Cholesterol:  Limit intake of cholesterol to less than 200 mg per day  Cholesterol is found in meat, eggs, and dairy      ¨ Saturated fat:  Limit saturated fat to less than 7% of your total daily calories  Ask your dietitian how many calories you need each day  Saturated fat is found in butter, cheese, ice cream, whole milk, and palm oil  Saturated fat is also found in meat, such as beef, pork, chicken skin, and processed meats  Processed meats include sausage, hot dogs, and bologna  ¨ Trans fat:  Avoid trans fat as much as possible  Trans fat is used in fried and baked foods  Foods that say trans fat free on the label may still have up to 0 5 grams of trans fat per serving  · Include healthy fats  Replace foods that are high in saturated and trans fat with foods high in healthy fats  This may help to decrease high cholesterol levels  ¨ Monounsaturated fats: These are found in avocados, nuts, and vegetable oils, such as olive, canola, and sunflower oil  ¨ Polyunsaturated fats: These can be found in vegetable oils, such as soybean or corn oil  Omega-3 fats can help to decrease the risk of heart disease  Omega-3 fats are found in fish, such as salmon, herring, trout, and tuna  Omega-3 fats can also be found in plant foods, such as walnuts, flaxseed, soybeans, and canola oil    Foods to limit or avoid:   · Grains:      ¨ Snacks that are made with partially hydrogenated oils, such as chips, regular crackers, and butter-flavored popcorn    ¨ High-fat baked goods, such as biscuits, croissants, doughnuts, pies, cookies, and pastries    · Dairy:      ¨ Whole milk, 2% milk, and yogurt and ice cream made with whole milk    ¨ Half and half creamer, heavy cream, and whipping cream    ¨ Cheese, cream cheese, and sour cream    · Meats and proteins:      ¨ High-fat cuts of meat (T-bone steak, regular hamburger, and ribs)    ¨ Fried meat, poultry (turkey and chicken), and fish    ¨ Poultry (chicken and turkey) with skin    ¨ Cold cuts (salami or bologna), hot dogs, lizarraga, and sausage    ¨ Whole eggs and egg yolks    · Vegetables and fruits with added fat:      ¨ Fried vegetables or vegetables in butter or high-fat sauces, such as cream or cheese sauces    ¨ Fried fruit or fruit served with butter or cream    · Fats:      ¨ Butter, stick margarine, and shortening    ¨ Coconut, palm oil, and palm kernel oil  Foods to include:   · Grains:      ¨ Whole-grain breads, cereals, pasta, and brown rice    ¨ Low-fat crackers and pretzels    · Vegetables and fruits:      ¨ Fresh, frozen, or canned vegetables (no salt or low-sodium)    ¨ Fresh, frozen, dried, or canned fruit (canned in light syrup or fruit juice)    ¨ Avocado    · Low-fat dairy products:      ¨ Nonfat (skim) or 1% milk    ¨ Nonfat or low-fat cheese, yogurt, and cottage cheese    · Meats and proteins:      ¨ Chicken or turkey with no skin    ¨ Baked or broiled fish    ¨ Lean beef and pork (loin, round, extra lean hamburger)    ¨ Beans and peas, unsalted nuts, soy products    ¨ Egg whites and substitutes    ¨ Seeds and nuts    · Fats:      ¨ Unsaturated oil, such as canola, olive, peanut, soybean, or sunflower oil    ¨ Soft or liquid margarine and vegetable oil spread    ¨ Low-fat salad dressing  Other ways to decrease fat:   · Read food labels before you buy foods  Choose foods that have less than 30% of calories from fat  Choose low-fat or fat-free dairy products  Remember that fat free does not mean calorie free  These foods still contain calories, and too many calories can lead to weight gain  · Trim fat from meat and avoid fried food  Trim all visible fat from meat before you cook it  Remove the skin from poultry  Do not cook meat, fish, or poultry  Bake, roast, boil, or broil these foods instead  Avoid fried foods  Eat a baked potato instead of [place] fries  Steam vegetables instead of sautéing them in butter  · Add less fat to foods  Use imitation lizarraga bits on salads and baked potatoes instead of regular lizarraga bits  Use fat-free or low-fat salad dressings instead of regular dressings   Use low-fat or nonfat butter-flavored topping instead of regular butter or margarine on popcorn and other foods  Ways to decrease fat in recipes:  Replace high-fat ingredients with low-fat or nonfat ones  This may cause baked goods to be drier than usual  You may need to use nonfat cooking spray on pans to prevent food from sticking  You also may need to change the amount of other ingredients, such as water, in the recipe  Try the following:  · Use low-fat or light margarine instead of regular margarine or shortening  · Use lean ground turkey breast or chicken, or lean ground beef (less than 5% fat) instead of hamburger  · Add 1 teaspoon of canola oil to 8 ounces of skim milk instead of using cream or half and half  · Use grated zucchini, carrots, or apples in breads instead of coconut  · Use blenderized, low-fat cottage cheese, plain tofu, or low-fat ricotta cheese instead of cream cheese  · Use 1 egg white and 1 teaspoon of canola oil, or use ¼ cup (2 ounces) of fat-free egg substitute instead of a whole egg  · Replace half of the oil that is called for in a recipe with applesauce when you bake  Use 3 tablespoons of cocoa powder and 1 tablespoon of canola oil instead of a square of baking chocolate  How to increase fiber:  Eat enough high-fiber foods to get 20 to 30 grams of fiber every day  Slowly increase your fiber intake to avoid stomach cramps, gas, and other problems  · Eat 3 ounces of whole-grain foods each day  An ounce is about 1 slice of bread  Eat whole-grain breads, such as whole-wheat bread  Whole wheat, whole-wheat flour, or other whole grains should be listed as the first ingredient on the food label  Replace white flour with whole-grain flour or use half of each in recipes  Whole-grain flour is heavier than white flour, so you may have to add more yeast or baking powder  · Eat a high-fiber cereal for breakfast   Oatmeal is a good source of soluble fiber  Look for cereals that have bran or fiber in the name   Choose whole-grain products, such as brown rice, barley, and whole-wheat pasta  · Eat more beans, peas, and lentils  For example, add beans to soups or salads  Eat at least 5 cups of fruits and vegetables each day  Eat fruits and vegetables with the peel because the peel is high in fiber  © 2017 [address] [employer]  Information is for End User's use only and may not be sold, redistributed or otherwise used for commercial purposes  All illustrations and images included in CareNotes® are the copyrighted property of [employer]  or [employer]ullen  The above information is an  only  It is not intended as medical advice for individual conditions or treatments  Talk to your doctor, nurse or pharmacist before following any medical regimen to see if it is safe and effective for you  Heart Healthy Diet   AMBULATORY CARE:   A heart healthy diet  is an eating plan low in total fat, unhealthy fats, and sodium (salt)  A heart healthy diet helps decrease your risk for heart disease and stroke  Limit the amount of fat you eat to 25% to 35% of your total daily calories  Limit sodium to less than 2,300 mg each day  Healthy fats:  Healthy fats can help improve cholesterol levels  The risk for heart disease is decreased when cholesterol levels are normal  Choose healthy fats, such as the following:  · Unsaturated fat  is found in foods such as soybean, canola, olive, corn, and safflower oils  It is also found in soft tub margarine that is made with liquid vegetable oil  · Omega-3 fat  is found in certain fish, such as salmon, tuna, and trout, and in walnuts and flaxseed  Unhealthy fats:  Unhealthy fats can cause unhealthy cholesterol levels in your blood and increase your risk of heart disease  Limit unhealthy fats, such as the following:  · Cholesterol  is found in animal foods, such as eggs and lobster, and in dairy products made from whole milk   Limit cholesterol to less than 300 milligrams (mg) each day  You may need to limit cholesterol to 200 mg each day if you have heart disease  · Saturated fat  is found in meats, such as lizarraga and hamburger  It is also found in chicken or turkey skin, whole milk, and butter  Limit saturated fat to less than 7% of your total daily calories  Limit saturated fat to less than 6% if you have heart disease or are at increased risk for it  · Trans fat  is found in packaged foods, such as potato chips and cookies  It is also in hard margarine, some fried foods, and shortening  Avoid trans fats as much as possible    Heart healthy foods and drinks to include:  Ask your dietitian or healthcare provider how many servings to have from each of the following food groups:  · Grains:      ¨ Whole-wheat breads, cereals, and pastas, and brown rice    ¨ Low-fat, low-sodium crackers and chips    · Vegetables:      ¨ Broccoli, green beans, green peas, and spinach    ¨ Collards, kale, and lima beans    ¨ Carrots, sweet potatoes, tomatoes, and peppers    ¨ Canned vegetables with no salt added    · Fruits:      ¨ Bananas, peaches, pears, and pineapple    ¨ Grapes, raisins, and dates    ¨ Oranges, tangerines, grapefruit, orange juice, and grapefruit juice    ¨ Apricots, mangoes, melons, and papaya    ¨ Raspberries and strawberries    ¨ Canned fruit with no added sugar    · Low-fat dairy products:      ¨ Nonfat (skim) milk, 1% milk, and low-fat almond, cashew, or soy milks fortified with calcium    ¨ Low-fat cheese, regular or frozen yogurt, and cottage cheese    · Meats and proteins , such as lean cuts of beef and pork (loin, leg, round), skinless chicken and turkey, legumes, soy products, egg whites, and nuts  Foods and drinks to limit or avoid:  Ask your dietitian or healthcare provider about these and other foods that are high in unhealthy fat, sodium, and sugar:  · Snack or packaged foods , such as frozen dinners, cookies, macaroni and cheese, and cereals with more than 300 mg of sodium per serving    · Canned or dry mixes  for cakes, soups, sauces, or gravies    · Vegetables with added sodium , such as instant potatoes, vegetables with added sauces, or regular canned vegetables    · Other foods high in sodium , such as ketchup, barbecue sauce, salad dressing, pickles, olives, soy sauce, and miso    · High-fat dairy foods  such as whole or 2% milk, cream cheese, or sour cream, and cheeses     · High-fat protein foods  such as high-fat cuts of beef (T-bone steaks, ribs), chicken or turkey with skin, and organ meats, such as liver    · Cured or smoked meats , such as hot dogs, lizarraga, and sausage    · Unhealthy fats and oils , such as butter, stick margarine, shortening, and cooking oils such as coconut or palm oil    · Food and drinks high in sugar , such as soft drinks (soda), sports drinks, sweetened tea, candy, cake, cookies, pies, and doughnuts  Other diet guidelines to follow:   · Eat more foods containing omega-3 fats  Eat fish high in omega-3 fats at least 2 times a week  · Limit alcohol  Too much alcohol can damage your heart and raise your blood pressure  Women should limit alcohol to 1 drink a day  Men should limit alcohol to 2 drinks a day  A drink of alcohol is 12 ounces of beer, 5 ounces of wine, or 1½ ounces of liquor  · Choose low-sodium foods  High-sodium foods can lead to high blood pressure  Add little or no salt to food you prepare  Use herbs and spices in place of salt  · Eat more fiber  to help lower cholesterol levels  Eat at least 5 servings of fruits and vegetables each day  Eat 3 ounces of whole-grain foods each day  Legumes (beans) are also a good source of fiber  Lifestyle guidelines:   · Do not smoke  Nicotine and other chemicals in cigarettes and cigars can cause lung and heart damage  Ask your healthcare provider for information if you currently smoke and need help to quit  E-cigarettes or smokeless tobacco still contain nicotine  Talk to your healthcare provider before you use these products  · Exercise regularly  to help you maintain a healthy weight and improve your blood pressure and cholesterol levels  Ask your healthcare provider about the best exercise plan for you  Do not start an exercise program without asking your healthcare provider  Follow up with your healthcare provider as directed:  Write down your questions so you remember to ask them during your visits  © 2017 [address] [employer]  Information is for End User's use only and may not be sold, redistributed or otherwise used for commercial purposes  All illustrations and images included in CareNotes® are the copyrighted property of [employer]  or [employer] Cullen  The above information is an  only  It is not intended as medical advice for individual conditions or treatments  Talk to your doctor, nurse or pharmacist before following any medical regimen to see if it is safe and effective for you

## 2020-01-02 ENCOUNTER — NURSE TRIAGE (OUTPATIENT)
Dept: PHYSICAL THERAPY | Facility: OTHER | Age: 75
End: 2020-01-02

## 2020-01-02 DIAGNOSIS — M54.50 ACUTE RIGHT-SIDED LOW BACK PAIN WITHOUT SCIATICA: Primary | ICD-10-CM

## 2020-01-02 NOTE — TELEPHONE ENCOUNTER
Additional Information   Negative: Is this related to a work injury?  Negative: Is this related to an MVA?  Negative: Are you currently recieving homecare services?  Negative: Has the patient had unexplained weight loss?  Negative: Does the patient have a fever?  Negative: Is this a chronic condition?  Negative: Is the patient experiencing blood in sputum?  Negative: Is the patient experiencing urine retention?  Negative: Is the patient experiencing acute drop foot or paralysis?  Negative: Has the patient experienced major trauma? (fall from height, high speed collision, direct blow to spine) and is also experiencing nausea, light-headedness, or loss of consciousness? Background - Initial Assessment  Clinical complaint: Acute low back pain, right sided to the buttocks  Pt stated that he has no known injury, and woke up with this low back pain  Pt has a history of back surgery for a ruptured herniated disc  Pt stated that this was 12 years ago  Pt is taking ibuprofen and icy/hot topical cream  Pt also stated that he is unable to sleep at night and cannot get comfortable to sit  Pt is not seeing any back/neck specialist   Date of onset: 2 weeks  Frequency of pain: constant  Quality of pain: aching    Protocols used: [facility] COMPREHENSIVE SPINE PROGRAM PROTOCOL    This nurse did review in detail the comp spine program and what we can provide for the pt for their back pain  Pt is agreeable to being triaged by this nurse and would like to have physical therapy  Referral was placed to the following:  Physical Therapy at the [facility] [place] [facility]"" location  Pt was given the ph # to that office  No further questions voiced at this time and Pt did state understanding of the referral that was placed

## 2020-04-03 ENCOUNTER — TRANSCRIBE ORDERS (OUTPATIENT)
Dept: NEUROSURGERY | Facility: CLINIC | Age: 75
End: 2020-04-03

## 2020-04-03 DIAGNOSIS — M54.50 LOWER BACK PAIN: Primary | ICD-10-CM

## 2020-04-09 ENCOUNTER — TELEMEDICINE (OUTPATIENT)
Dept: NEUROSURGERY | Facility: CLINIC | Age: 75
End: 2020-04-09
Payer: COMMERCIAL

## 2020-04-09 DIAGNOSIS — M54.16 LUMBAR RADICULOPATHY: ICD-10-CM

## 2020-04-09 DIAGNOSIS — Q85.03 SCHWANNOMATOSIS (HCC): ICD-10-CM

## 2020-04-09 DIAGNOSIS — D49.2 LUMBAR SPINE TUMOR: Primary | ICD-10-CM

## 2020-04-09 DIAGNOSIS — M54.50 LOWER BACK PAIN: ICD-10-CM

## 2020-04-09 DIAGNOSIS — D49.2 THORACIC SPINE TUMOR: ICD-10-CM

## 2020-04-09 PROCEDURE — 1160F RVW MEDS BY RX/DR IN RCRD: CPT | Performed by: NEUROLOGICAL SURGERY

## 2020-04-09 PROCEDURE — 99204 OFFICE O/P NEW MOD 45 MIN: CPT | Performed by: NEUROLOGICAL SURGERY

## 2020-04-17 ENCOUNTER — TELEPHONE (OUTPATIENT)
Dept: NEUROSURGERY | Facility: CLINIC | Age: 75
End: 2020-04-17

## 2020-05-14 ENCOUNTER — OFFICE VISIT (OUTPATIENT)
Dept: NEUROSURGERY | Facility: CLINIC | Age: 75
End: 2020-05-14
Payer: COMMERCIAL

## 2020-05-14 VITALS
HEART RATE: 67 BPM | RESPIRATION RATE: 16 BRPM | HEIGHT: 69 IN | TEMPERATURE: 97.8 F | BODY MASS INDEX: 25.18 KG/M2 | SYSTOLIC BLOOD PRESSURE: 136 MMHG | WEIGHT: 170 LBS | DIASTOLIC BLOOD PRESSURE: 80 MMHG

## 2020-05-14 DIAGNOSIS — D49.7 INTRADURAL EXTRAMEDULLARY SPINAL TUMOR: Primary | ICD-10-CM

## 2020-05-14 DIAGNOSIS — Z11.59 SCREENING FOR VIRAL DISEASE: ICD-10-CM

## 2020-05-14 PROCEDURE — 1036F TOBACCO NON-USER: CPT | Performed by: PHYSICIAN ASSISTANT

## 2020-05-14 PROCEDURE — 3008F BODY MASS INDEX DOCD: CPT | Performed by: PHYSICIAN ASSISTANT

## 2020-05-14 PROCEDURE — 1160F RVW MEDS BY RX/DR IN RCRD: CPT | Performed by: PHYSICIAN ASSISTANT

## 2020-05-14 PROCEDURE — 99213 OFFICE O/P EST LOW 20 MIN: CPT | Performed by: PHYSICIAN ASSISTANT

## 2020-05-14 RX ORDER — VANCOMYCIN HYDROCHLORIDE 1 G/200ML
1000 INJECTION, SOLUTION INTRAVENOUS ONCE
Status: CANCELLED | OUTPATIENT
Start: 2020-05-14 | End: 2020-05-14

## 2020-05-14 RX ORDER — CHLORHEXIDINE GLUCONATE 0.12 MG/ML
15 RINSE ORAL ONCE
Status: CANCELLED | OUTPATIENT
Start: 2020-05-14 | End: 2020-05-14

## 2020-05-19 ENCOUNTER — OFFICE VISIT (OUTPATIENT)
Dept: LAB | Facility: CLINIC | Age: 75
End: 2020-05-19
Payer: COMMERCIAL

## 2020-05-19 ENCOUNTER — APPOINTMENT (OUTPATIENT)
Dept: LAB | Facility: CLINIC | Age: 75
End: 2020-05-19
Payer: COMMERCIAL

## 2020-05-19 ENCOUNTER — TRANSCRIBE ORDERS (OUTPATIENT)
Dept: LAB | Facility: CLINIC | Age: 75
End: 2020-05-19

## 2020-05-19 DIAGNOSIS — Z11.59 SCREENING FOR VIRAL DISEASE: ICD-10-CM

## 2020-05-19 DIAGNOSIS — D49.7 INTRADURAL EXTRAMEDULLARY SPINAL TUMOR: ICD-10-CM

## 2020-05-19 DIAGNOSIS — D49.7 INTRADURAL EXTRAMEDULLARY SPINAL TUMOR: Primary | ICD-10-CM

## 2020-05-19 LAB
ABO GROUP BLD: NORMAL
ALBUMIN SERPL BCP-MCNC: 3.8 G/DL (ref 3.5–5)
ALP SERPL-CCNC: 46 U/L (ref 46–116)
ALT SERPL W P-5'-P-CCNC: 20 U/L (ref 12–78)
ANION GAP SERPL CALCULATED.3IONS-SCNC: 8 MMOL/L (ref 4–13)
APTT PPP: 30 SECONDS (ref 23–37)
AST SERPL W P-5'-P-CCNC: 7 U/L (ref 5–45)
ATRIAL RATE: 61 BPM
BASOPHILS # BLD AUTO: 0.04 THOUSANDS/ΜL (ref 0–0.1)
BASOPHILS NFR BLD AUTO: 1 % (ref 0–1)
BILIRUB SERPL-MCNC: 1.03 MG/DL (ref 0.2–1)
BILIRUB UR QL STRIP: NEGATIVE
BLD GP AB SCN SERPL QL: NEGATIVE
BUN SERPL-MCNC: 17 MG/DL (ref 5–25)
CALCIUM SERPL-MCNC: 8.5 MG/DL (ref 8.3–10.1)
CHLORIDE SERPL-SCNC: 103 MMOL/L (ref 100–108)
CLARITY UR: CLEAR
CO2 SERPL-SCNC: 28 MMOL/L (ref 21–32)
COLOR UR: YELLOW
CREAT SERPL-MCNC: 0.95 MG/DL (ref 0.6–1.3)
EOSINOPHIL # BLD AUTO: 1 THOUSAND/ΜL (ref 0–0.61)
EOSINOPHIL NFR BLD AUTO: 27 % (ref 0–6)
ERYTHROCYTE [DISTWIDTH] IN BLOOD BY AUTOMATED COUNT: 12.5 % (ref 11.6–15.1)
EST. AVERAGE GLUCOSE BLD GHB EST-MCNC: 103 MG/DL
GFR SERPL CREATININE-BSD FRML MDRD: 79 ML/MIN/1.73SQ M
GLUCOSE P FAST SERPL-MCNC: 88 MG/DL (ref 65–99)
GLUCOSE UR STRIP-MCNC: NEGATIVE MG/DL
HBA1C MFR BLD: 5.2 %
HCT VFR BLD AUTO: 45.7 % (ref 36.5–49.3)
HGB BLD-MCNC: 15.2 G/DL (ref 12–17)
HGB UR QL STRIP.AUTO: NEGATIVE
IMM GRANULOCYTES # BLD AUTO: 0 THOUSAND/UL (ref 0–0.2)
IMM GRANULOCYTES NFR BLD AUTO: 0 % (ref 0–2)
INR PPP: 1.04 (ref 0.84–1.19)
KETONES UR STRIP-MCNC: NEGATIVE MG/DL
LEUKOCYTE ESTERASE UR QL STRIP: NEGATIVE
LYMPHOCYTES # BLD AUTO: 1.31 THOUSANDS/ΜL (ref 0.6–4.47)
LYMPHOCYTES NFR BLD AUTO: 34 % (ref 14–44)
MCH RBC QN AUTO: 30.9 PG (ref 26.8–34.3)
MCHC RBC AUTO-ENTMCNC: 33.3 G/DL (ref 31.4–37.4)
MCV RBC AUTO: 93 FL (ref 82–98)
MONOCYTES # BLD AUTO: 0.34 THOUSAND/ΜL (ref 0.17–1.22)
MONOCYTES NFR BLD AUTO: 9 % (ref 4–12)
NEUTROPHILS # BLD AUTO: 1.08 THOUSANDS/ΜL (ref 1.85–7.62)
NEUTS SEG NFR BLD AUTO: 29 % (ref 43–75)
NITRITE UR QL STRIP: NEGATIVE
NRBC BLD AUTO-RTO: 0 /100 WBCS
P AXIS: 12 DEGREES
PH UR STRIP.AUTO: 5.5 [PH]
PLATELET # BLD AUTO: 171 THOUSANDS/UL (ref 149–390)
PMV BLD AUTO: 11.2 FL (ref 8.9–12.7)
POTASSIUM SERPL-SCNC: 3.9 MMOL/L (ref 3.5–5.3)
PR INTERVAL: 138 MS
PROT SERPL-MCNC: 6.7 G/DL (ref 6.4–8.2)
PROT UR STRIP-MCNC: NEGATIVE MG/DL
PROTHROMBIN TIME: 13 SECONDS (ref 11.6–14.5)
QRS AXIS: 40 DEGREES
QRSD INTERVAL: 80 MS
QT INTERVAL: 396 MS
QTC INTERVAL: 398 MS
RBC # BLD AUTO: 4.92 MILLION/UL (ref 3.88–5.62)
RH BLD: POSITIVE
SODIUM SERPL-SCNC: 139 MMOL/L (ref 136–145)
SP GR UR STRIP.AUTO: 1.02 (ref 1–1.03)
SPECIMEN EXPIRATION DATE: NORMAL
T WAVE AXIS: 33 DEGREES
UROBILINOGEN UR QL STRIP.AUTO: 0.2 E.U./DL
VENTRICULAR RATE: 61 BPM
WBC # BLD AUTO: 3.77 THOUSAND/UL (ref 4.31–10.16)

## 2020-05-19 PROCEDURE — 93005 ELECTROCARDIOGRAM TRACING: CPT

## 2020-05-19 PROCEDURE — 85025 COMPLETE CBC W/AUTO DIFF WBC: CPT

## 2020-05-19 PROCEDURE — 36415 COLL VENOUS BLD VENIPUNCTURE: CPT

## 2020-05-19 PROCEDURE — 86900 BLOOD TYPING SEROLOGIC ABO: CPT | Performed by: PHYSICIAN ASSISTANT

## 2020-05-19 PROCEDURE — 85730 THROMBOPLASTIN TIME PARTIAL: CPT

## 2020-05-19 PROCEDURE — 81003 URINALYSIS AUTO W/O SCOPE: CPT | Performed by: PHYSICIAN ASSISTANT

## 2020-05-19 PROCEDURE — 86850 RBC ANTIBODY SCREEN: CPT | Performed by: PHYSICIAN ASSISTANT

## 2020-05-19 PROCEDURE — U0003 INFECTIOUS AGENT DETECTION BY NUCLEIC ACID (DNA OR RNA); SEVERE ACUTE RESPIRATORY SYNDROME CORONAVIRUS 2 (SARS-COV-2) (CORONAVIRUS DISEASE [COVID-19]), AMPLIFIED PROBE TECHNIQUE, MAKING USE OF HIGH THROUGHPUT TECHNOLOGIES AS DESCRIBED BY CMS-2020-01-R: HCPCS

## 2020-05-19 PROCEDURE — 80053 COMPREHEN METABOLIC PANEL: CPT

## 2020-05-19 PROCEDURE — 83036 HEMOGLOBIN GLYCOSYLATED A1C: CPT

## 2020-05-19 PROCEDURE — 93010 ELECTROCARDIOGRAM REPORT: CPT | Performed by: INTERNAL MEDICINE

## 2020-05-19 PROCEDURE — 85610 PROTHROMBIN TIME: CPT

## 2020-05-19 PROCEDURE — 86901 BLOOD TYPING SEROLOGIC RH(D): CPT | Performed by: PHYSICIAN ASSISTANT

## 2020-05-20 ENCOUNTER — ANESTHESIA EVENT (OUTPATIENT)
Dept: PERIOP | Facility: HOSPITAL | Age: 75
DRG: 030 | End: 2020-05-20
Payer: COMMERCIAL

## 2020-05-20 LAB — SARS-COV-2 RNA SPEC QL NAA+PROBE: NOT DETECTED

## 2020-05-26 ENCOUNTER — DOCUMENTATION (OUTPATIENT)
Dept: NEUROSURGERY | Facility: CLINIC | Age: 75
End: 2020-05-26

## 2020-05-27 ENCOUNTER — APPOINTMENT (OUTPATIENT)
Dept: RADIOLOGY | Facility: HOSPITAL | Age: 75
DRG: 030 | End: 2020-05-27
Payer: COMMERCIAL

## 2020-05-27 ENCOUNTER — HOSPITAL ENCOUNTER (INPATIENT)
Facility: HOSPITAL | Age: 75
LOS: 3 days | Discharge: HOME | DRG: 030 | End: 2020-05-30
Attending: NEUROLOGICAL SURGERY | Admitting: NEUROLOGICAL SURGERY
Payer: COMMERCIAL

## 2020-05-27 ENCOUNTER — ANESTHESIA (OUTPATIENT)
Dept: PERIOP | Facility: HOSPITAL | Age: 75
DRG: 030 | End: 2020-05-27
Payer: COMMERCIAL

## 2020-05-27 DIAGNOSIS — Z11.59 SCREENING FOR VIRAL DISEASE: ICD-10-CM

## 2020-05-27 DIAGNOSIS — Z98.890 POST-OPERATIVE STATE: Primary | ICD-10-CM

## 2020-05-27 DIAGNOSIS — Q85.03 SCHWANNOMATOSIS (HCC): ICD-10-CM

## 2020-05-27 DIAGNOSIS — D49.7 INTRADURAL EXTRAMEDULLARY SPINAL TUMOR: ICD-10-CM

## 2020-05-27 LAB
ABO GROUP BLD: NORMAL
PLATELET # BLD AUTO: 145 THOUSANDS/UL (ref 149–390)
PMV BLD AUTO: 11 FL (ref 8.9–12.7)
RH BLD: POSITIVE

## 2020-05-27 PROCEDURE — 63282 BX/EXC IDRL SPINE LESN LMBR: CPT | Performed by: NEUROLOGICAL SURGERY

## 2020-05-27 PROCEDURE — 88331 PATH CONSLTJ SURG 1 BLK 1SPC: CPT | Performed by: PATHOLOGY

## 2020-05-27 PROCEDURE — 72020 X-RAY EXAM OF SPINE 1 VIEW: CPT

## 2020-05-27 PROCEDURE — 69990 MICROSURGERY ADD-ON: CPT | Performed by: NEUROLOGICAL SURGERY

## 2020-05-27 PROCEDURE — 85049 AUTOMATED PLATELET COUNT: CPT | Performed by: PHYSICIAN ASSISTANT

## 2020-05-27 PROCEDURE — 86920 COMPATIBILITY TEST SPIN: CPT

## 2020-05-27 PROCEDURE — 87081 CULTURE SCREEN ONLY: CPT | Performed by: PHYSICIAN ASSISTANT

## 2020-05-27 PROCEDURE — 88307 TISSUE EXAM BY PATHOLOGIST: CPT | Performed by: PATHOLOGY

## 2020-05-27 PROCEDURE — 00BY0ZX EXCISION OF LUMBAR SPINAL CORD, OPEN APPROACH, DIAGNOSTIC: ICD-10-PCS | Performed by: NEUROLOGICAL SURGERY

## 2020-05-27 RX ORDER — OXYCODONE HYDROCHLORIDE 5 MG/1
5 TABLET ORAL EVERY 4 HOURS PRN
Status: DISCONTINUED | OUTPATIENT
Start: 2020-05-27 | End: 2020-05-30 | Stop reason: HOSPADM

## 2020-05-27 RX ORDER — HYDROMORPHONE HCL/PF 1 MG/ML
0.5 SYRINGE (ML) INJECTION
Status: DISCONTINUED | OUTPATIENT
Start: 2020-05-27 | End: 2020-05-27 | Stop reason: HOSPADM

## 2020-05-27 RX ORDER — BUPIVACAINE HYDROCHLORIDE AND EPINEPHRINE 5; 5 MG/ML; UG/ML
INJECTION, SOLUTION EPIDURAL; INTRACAUDAL; PERINEURAL AS NEEDED
Status: DISCONTINUED | OUTPATIENT
Start: 2020-05-27 | End: 2020-05-27 | Stop reason: HOSPADM

## 2020-05-27 RX ORDER — ONDANSETRON 2 MG/ML
4 INJECTION INTRAMUSCULAR; INTRAVENOUS EVERY 6 HOURS PRN
Status: DISCONTINUED | OUTPATIENT
Start: 2020-05-27 | End: 2020-05-30 | Stop reason: HOSPADM

## 2020-05-27 RX ORDER — HEPARIN SODIUM 5000 [USP'U]/ML
5000 INJECTION, SOLUTION INTRAVENOUS; SUBCUTANEOUS EVERY 8 HOURS SCHEDULED
Status: DISCONTINUED | OUTPATIENT
Start: 2020-05-28 | End: 2020-05-30 | Stop reason: HOSPADM

## 2020-05-27 RX ORDER — SODIUM CHLORIDE 9 MG/ML
75 INJECTION, SOLUTION INTRAVENOUS CONTINUOUS
Status: DISCONTINUED | OUTPATIENT
Start: 2020-05-27 | End: 2020-05-28

## 2020-05-27 RX ORDER — MAGNESIUM HYDROXIDE 1200 MG/15ML
LIQUID ORAL AS NEEDED
Status: DISCONTINUED | OUTPATIENT
Start: 2020-05-27 | End: 2020-05-27 | Stop reason: HOSPADM

## 2020-05-27 RX ORDER — ALBUMIN, HUMAN INJ 5% 5 %
SOLUTION INTRAVENOUS CONTINUOUS PRN
Status: DISCONTINUED | OUTPATIENT
Start: 2020-05-27 | End: 2020-05-27 | Stop reason: SURG

## 2020-05-27 RX ORDER — SENNOSIDES 8.6 MG
1 TABLET ORAL DAILY
Status: DISCONTINUED | OUTPATIENT
Start: 2020-05-28 | End: 2020-05-30 | Stop reason: HOSPADM

## 2020-05-27 RX ORDER — SODIUM CHLORIDE, SODIUM LACTATE, POTASSIUM CHLORIDE, CALCIUM CHLORIDE 600; 310; 30; 20 MG/100ML; MG/100ML; MG/100ML; MG/100ML
75 INJECTION, SOLUTION INTRAVENOUS CONTINUOUS
Status: DISCONTINUED | OUTPATIENT
Start: 2020-05-27 | End: 2020-05-28

## 2020-05-27 RX ORDER — PROPOFOL 10 MG/ML
INJECTION, EMULSION INTRAVENOUS CONTINUOUS PRN
Status: DISCONTINUED | OUTPATIENT
Start: 2020-05-27 | End: 2020-05-27 | Stop reason: SURG

## 2020-05-27 RX ORDER — PROPOFOL 10 MG/ML
INJECTION, EMULSION INTRAVENOUS AS NEEDED
Status: DISCONTINUED | OUTPATIENT
Start: 2020-05-27 | End: 2020-05-27 | Stop reason: SURG

## 2020-05-27 RX ORDER — LIDOCAINE HYDROCHLORIDE AND EPINEPHRINE 10; 10 MG/ML; UG/ML
INJECTION, SOLUTION INFILTRATION; PERINEURAL AS NEEDED
Status: DISCONTINUED | OUTPATIENT
Start: 2020-05-27 | End: 2020-05-27 | Stop reason: HOSPADM

## 2020-05-27 RX ORDER — DEXAMETHASONE SODIUM PHOSPHATE 10 MG/ML
INJECTION, SOLUTION INTRAMUSCULAR; INTRAVENOUS AS NEEDED
Status: DISCONTINUED | OUTPATIENT
Start: 2020-05-27 | End: 2020-05-27 | Stop reason: SURG

## 2020-05-27 RX ORDER — FLUTICASONE PROPIONATE 50 MCG
1 SPRAY, SUSPENSION (ML) NASAL DAILY PRN
Status: DISCONTINUED | OUTPATIENT
Start: 2020-05-28 | End: 2020-05-30 | Stop reason: HOSPADM

## 2020-05-27 RX ORDER — METHOCARBAMOL 500 MG/1
500 TABLET, FILM COATED ORAL EVERY 6 HOURS SCHEDULED
Status: DISCONTINUED | OUTPATIENT
Start: 2020-05-27 | End: 2020-05-30 | Stop reason: HOSPADM

## 2020-05-27 RX ORDER — OXYCODONE HYDROCHLORIDE 5 MG/1
2.5 TABLET ORAL EVERY 4 HOURS PRN
Status: DISCONTINUED | OUTPATIENT
Start: 2020-05-27 | End: 2020-05-30 | Stop reason: HOSPADM

## 2020-05-27 RX ORDER — KETAMINE HYDROCHLORIDE 50 MG/ML
INJECTION, SOLUTION, CONCENTRATE INTRAMUSCULAR; INTRAVENOUS AS NEEDED
Status: DISCONTINUED | OUTPATIENT
Start: 2020-05-27 | End: 2020-05-27 | Stop reason: SURG

## 2020-05-27 RX ORDER — DOCUSATE SODIUM 100 MG/1
100 CAPSULE, LIQUID FILLED ORAL 2 TIMES DAILY
Status: DISCONTINUED | OUTPATIENT
Start: 2020-05-27 | End: 2020-05-30 | Stop reason: HOSPADM

## 2020-05-27 RX ORDER — SODIUM CHLORIDE 9 MG/ML
INJECTION, SOLUTION INTRAVENOUS CONTINUOUS PRN
Status: DISCONTINUED | OUTPATIENT
Start: 2020-05-27 | End: 2020-05-27 | Stop reason: SURG

## 2020-05-27 RX ORDER — ONDANSETRON 2 MG/ML
4 INJECTION INTRAMUSCULAR; INTRAVENOUS ONCE AS NEEDED
Status: DISCONTINUED | OUTPATIENT
Start: 2020-05-27 | End: 2020-05-27 | Stop reason: HOSPADM

## 2020-05-27 RX ORDER — VANCOMYCIN HYDROCHLORIDE 1 G/200ML
1000 INJECTION, SOLUTION INTRAVENOUS EVERY 12 HOURS
Status: COMPLETED | OUTPATIENT
Start: 2020-05-27 | End: 2020-05-30

## 2020-05-27 RX ORDER — LIDOCAINE HYDROCHLORIDE 10 MG/ML
INJECTION, SOLUTION EPIDURAL; INFILTRATION; INTRACAUDAL; PERINEURAL AS NEEDED
Status: DISCONTINUED | OUTPATIENT
Start: 2020-05-27 | End: 2020-05-27 | Stop reason: SURG

## 2020-05-27 RX ORDER — HYDROMORPHONE HCL/PF 1 MG/ML
SYRINGE (ML) INJECTION AS NEEDED
Status: DISCONTINUED | OUTPATIENT
Start: 2020-05-27 | End: 2020-05-27 | Stop reason: SURG

## 2020-05-27 RX ORDER — LIDOCAINE 50 MG/G
2 PATCH TOPICAL DAILY
Status: DISCONTINUED | OUTPATIENT
Start: 2020-05-28 | End: 2020-05-30 | Stop reason: HOSPADM

## 2020-05-27 RX ORDER — HYDROMORPHONE HCL/PF 1 MG/ML
0.2 SYRINGE (ML) INJECTION EVERY 4 HOURS PRN
Status: DISCONTINUED | OUTPATIENT
Start: 2020-05-27 | End: 2020-05-29

## 2020-05-27 RX ORDER — ONDANSETRON 2 MG/ML
INJECTION INTRAMUSCULAR; INTRAVENOUS AS NEEDED
Status: DISCONTINUED | OUTPATIENT
Start: 2020-05-27 | End: 2020-05-27 | Stop reason: SURG

## 2020-05-27 RX ORDER — MIDAZOLAM HYDROCHLORIDE 2 MG/2ML
INJECTION, SOLUTION INTRAMUSCULAR; INTRAVENOUS AS NEEDED
Status: DISCONTINUED | OUTPATIENT
Start: 2020-05-27 | End: 2020-05-27 | Stop reason: SURG

## 2020-05-27 RX ORDER — ACETAMINOPHEN 325 MG/1
975 TABLET ORAL EVERY 8 HOURS SCHEDULED
Status: DISCONTINUED | OUTPATIENT
Start: 2020-05-27 | End: 2020-05-30 | Stop reason: HOSPADM

## 2020-05-27 RX ORDER — VANCOMYCIN HYDROCHLORIDE 1 G/20ML
INJECTION, POWDER, LYOPHILIZED, FOR SOLUTION INTRAVENOUS AS NEEDED
Status: DISCONTINUED | OUTPATIENT
Start: 2020-05-27 | End: 2020-05-27 | Stop reason: HOSPADM

## 2020-05-27 RX ORDER — VANCOMYCIN HYDROCHLORIDE 1 G/200ML
1000 INJECTION, SOLUTION INTRAVENOUS ONCE
Status: COMPLETED | OUTPATIENT
Start: 2020-05-27 | End: 2020-05-27

## 2020-05-27 RX ORDER — FENTANYL CITRATE 50 UG/ML
INJECTION, SOLUTION INTRAMUSCULAR; INTRAVENOUS AS NEEDED
Status: DISCONTINUED | OUTPATIENT
Start: 2020-05-27 | End: 2020-05-27 | Stop reason: SURG

## 2020-05-27 RX ORDER — FLUTICASONE PROPIONATE 50 MCG
1 SPRAY, SUSPENSION (ML) NASAL AS NEEDED
COMMUNITY

## 2020-05-27 RX ORDER — LATANOPROST 50 UG/ML
1 SOLUTION/ DROPS OPHTHALMIC
Status: DISCONTINUED | OUTPATIENT
Start: 2020-05-27 | End: 2020-05-30 | Stop reason: HOSPADM

## 2020-05-27 RX ORDER — CHLORHEXIDINE GLUCONATE 0.12 MG/ML
15 RINSE ORAL ONCE
Status: COMPLETED | OUTPATIENT
Start: 2020-05-27 | End: 2020-05-27

## 2020-05-27 RX ORDER — FENTANYL CITRATE/PF 50 MCG/ML
25 SYRINGE (ML) INJECTION
Status: DISCONTINUED | OUTPATIENT
Start: 2020-05-27 | End: 2020-05-27 | Stop reason: HOSPADM

## 2020-05-27 RX ORDER — SUCCINYLCHOLINE/SOD CL,ISO/PF 100 MG/5ML
SYRINGE (ML) INTRAVENOUS AS NEEDED
Status: DISCONTINUED | OUTPATIENT
Start: 2020-05-27 | End: 2020-05-27 | Stop reason: SURG

## 2020-05-27 RX ADMIN — KETAMINE HYDROCHLORIDE 30 MG: 50 INJECTION, SOLUTION INTRAMUSCULAR; INTRAVENOUS at 08:16

## 2020-05-27 RX ADMIN — VANCOMYCIN HYDROCHLORIDE 1000 MG: 1 INJECTION, SOLUTION INTRAVENOUS at 18:11

## 2020-05-27 RX ADMIN — ALBUMIN (HUMAN): 12.5 SOLUTION INTRAVENOUS at 09:04

## 2020-05-27 RX ADMIN — MIDAZOLAM 2 MG: 1 INJECTION INTRAMUSCULAR; INTRAVENOUS at 08:13

## 2020-05-27 RX ADMIN — Medication 100 MG: at 08:17

## 2020-05-27 RX ADMIN — ONDANSETRON 4 MG: 2 INJECTION INTRAMUSCULAR; INTRAVENOUS at 08:16

## 2020-05-27 RX ADMIN — FENTANYL CITRATE 50 MCG: 50 INJECTION, SOLUTION INTRAMUSCULAR; INTRAVENOUS at 08:16

## 2020-05-27 RX ADMIN — FENTANYL CITRATE 25 MCG: 50 INJECTION INTRAMUSCULAR; INTRAVENOUS at 15:52

## 2020-05-27 RX ADMIN — ONDANSETRON 4 MG: 2 INJECTION INTRAMUSCULAR; INTRAVENOUS at 13:18

## 2020-05-27 RX ADMIN — SODIUM CHLORIDE, SODIUM LACTATE, POTASSIUM CHLORIDE, AND CALCIUM CHLORIDE 75 ML/HR: .6; .31; .03; .02 INJECTION, SOLUTION INTRAVENOUS at 07:27

## 2020-05-27 RX ADMIN — ALBUMIN (HUMAN): 12.5 SOLUTION INTRAVENOUS at 11:07

## 2020-05-27 RX ADMIN — LIDOCAINE HYDROCHLORIDE 80 MG: 10 INJECTION, SOLUTION EPIDURAL; INFILTRATION; INTRACAUDAL; PERINEURAL at 08:16

## 2020-05-27 RX ADMIN — SODIUM CHLORIDE, SODIUM LACTATE, POTASSIUM CHLORIDE, AND CALCIUM CHLORIDE: .6; .31; .03; .02 INJECTION, SOLUTION INTRAVENOUS at 09:32

## 2020-05-27 RX ADMIN — SODIUM CHLORIDE 75 ML/HR: 0.9 INJECTION, SOLUTION INTRAVENOUS at 16:38

## 2020-05-27 RX ADMIN — HYDROMORPHONE HYDROCHLORIDE 0.5 MG: 1 INJECTION, SOLUTION INTRAMUSCULAR; INTRAVENOUS; SUBCUTANEOUS at 16:13

## 2020-05-27 RX ADMIN — PHENYLEPHRINE HYDROCHLORIDE 100 MCG: 10 INJECTION INTRAVENOUS at 08:16

## 2020-05-27 RX ADMIN — FENTANYL CITRATE 25 MCG: 50 INJECTION INTRAMUSCULAR; INTRAVENOUS at 15:57

## 2020-05-27 RX ADMIN — VANCOMYCIN HYDROCHLORIDE 1000 MG: 1 INJECTION, SOLUTION INTRAVENOUS at 07:28

## 2020-05-27 RX ADMIN — DEXMEDETOMIDINE 0.2 MCG/KG/HR: 100 INJECTION, SOLUTION, CONCENTRATE INTRAVENOUS at 08:56

## 2020-05-27 RX ADMIN — SODIUM CHLORIDE: 0.9 INJECTION, SOLUTION INTRAVENOUS at 08:22

## 2020-05-27 RX ADMIN — CHLORHEXIDINE GLUCONATE 0.12% ORAL RINSE 15 ML: 1.2 LIQUID ORAL at 07:11

## 2020-05-27 RX ADMIN — ACETAMINOPHEN 975 MG: 325 TABLET ORAL at 22:00

## 2020-05-27 RX ADMIN — DEXAMETHASONE SODIUM PHOSPHATE 10 MG: 10 INJECTION, SOLUTION INTRAMUSCULAR; INTRAVENOUS at 08:21

## 2020-05-27 RX ADMIN — PROPOFOL 150 MG: 10 INJECTION, EMULSION INTRAVENOUS at 08:16

## 2020-05-27 RX ADMIN — METHOCARBAMOL TABLETS 500 MG: 500 TABLET, COATED ORAL at 18:11

## 2020-05-27 RX ADMIN — KETAMINE HYDROCHLORIDE 20 MG: 50 INJECTION, SOLUTION INTRAMUSCULAR; INTRAVENOUS at 09:19

## 2020-05-27 RX ADMIN — HYDROMORPHONE HYDROCHLORIDE 0.5 MG: 1 INJECTION, SOLUTION INTRAMUSCULAR; INTRAVENOUS; SUBCUTANEOUS at 16:01

## 2020-05-27 RX ADMIN — REMIFENTANIL HYDROCHLORIDE 0.1 MCG/KG/MIN: 1 INJECTION, POWDER, LYOPHILIZED, FOR SOLUTION INTRAVENOUS at 08:56

## 2020-05-27 RX ADMIN — METHOCARBAMOL TABLETS 500 MG: 500 TABLET, COATED ORAL at 23:15

## 2020-05-27 RX ADMIN — HYDROMORPHONE HYDROCHLORIDE 0.5 MG: 1 INJECTION, SOLUTION INTRAMUSCULAR; INTRAVENOUS; SUBCUTANEOUS at 09:38

## 2020-05-27 RX ADMIN — HYDROMORPHONE HYDROCHLORIDE 0.5 MG: 1 INJECTION, SOLUTION INTRAMUSCULAR; INTRAVENOUS; SUBCUTANEOUS at 10:29

## 2020-05-27 RX ADMIN — HYDROMORPHONE HYDROCHLORIDE 0.5 MG: 1 INJECTION, SOLUTION INTRAMUSCULAR; INTRAVENOUS; SUBCUTANEOUS at 09:32

## 2020-05-27 RX ADMIN — PROPOFOL 120 MCG/KG/MIN: 10 INJECTION, EMULSION INTRAVENOUS at 08:56

## 2020-05-27 RX ADMIN — FENTANYL CITRATE 25 MCG: 50 INJECTION INTRAMUSCULAR; INTRAVENOUS at 15:33

## 2020-05-27 RX ADMIN — FENTANYL CITRATE 25 MCG: 50 INJECTION INTRAMUSCULAR; INTRAVENOUS at 15:45

## 2020-05-27 RX ADMIN — FENTANYL CITRATE 50 MCG: 50 INJECTION, SOLUTION INTRAMUSCULAR; INTRAVENOUS at 10:20

## 2020-05-27 RX ADMIN — PHENYLEPHRINE HYDROCHLORIDE 50 MCG: 10 INJECTION INTRAVENOUS at 09:55

## 2020-05-27 RX ADMIN — DOCUSATE SODIUM 100 MG: 100 CAPSULE, LIQUID FILLED ORAL at 18:10

## 2020-05-27 RX ADMIN — PHENYLEPHRINE HYDROCHLORIDE 20 MCG/MIN: 10 INJECTION INTRAVENOUS at 08:58

## 2020-05-27 RX ADMIN — PHENYLEPHRINE HYDROCHLORIDE 10 MCG/MIN: 10 INJECTION INTRAVENOUS at 11:31

## 2020-05-28 LAB
ERYTHROCYTE [DISTWIDTH] IN BLOOD BY AUTOMATED COUNT: 12.3 % (ref 11.6–15.1)
HCT VFR BLD AUTO: 38.5 % (ref 36.5–49.3)
HGB BLD-MCNC: 12.6 G/DL (ref 12–17)
MCH RBC QN AUTO: 30.4 PG (ref 26.8–34.3)
MCHC RBC AUTO-ENTMCNC: 32.7 G/DL (ref 31.4–37.4)
MCV RBC AUTO: 93 FL (ref 82–98)
MRSA NOSE QL CULT: NORMAL
PLATELET # BLD AUTO: 150 THOUSANDS/UL (ref 149–390)
PMV BLD AUTO: 11 FL (ref 8.9–12.7)
RBC # BLD AUTO: 4.15 MILLION/UL (ref 3.88–5.62)
WBC # BLD AUTO: 8.28 THOUSAND/UL (ref 4.31–10.16)

## 2020-05-28 PROCEDURE — 99024 POSTOP FOLLOW-UP VISIT: CPT | Performed by: NEUROLOGICAL SURGERY

## 2020-05-28 PROCEDURE — 85027 COMPLETE CBC AUTOMATED: CPT | Performed by: PHYSICIAN ASSISTANT

## 2020-05-28 RX ORDER — SODIUM CHLORIDE 9 MG/ML
75 INJECTION, SOLUTION INTRAVENOUS CONTINUOUS
Status: DISCONTINUED | OUTPATIENT
Start: 2020-05-28 | End: 2020-05-29

## 2020-05-28 RX ORDER — ZOLPIDEM TARTRATE 5 MG/1
5 TABLET ORAL
Status: DISCONTINUED | OUTPATIENT
Start: 2020-05-28 | End: 2020-05-30 | Stop reason: HOSPADM

## 2020-05-28 RX ORDER — DIAZEPAM 5 MG/1
5 TABLET ORAL EVERY 6 HOURS PRN
Status: COMPLETED | OUTPATIENT
Start: 2020-05-28 | End: 2020-05-28

## 2020-05-28 RX ADMIN — EZETIMIBE: 10 TABLET ORAL at 23:38

## 2020-05-28 RX ADMIN — HEPARIN SODIUM 5000 UNITS: 5000 INJECTION INTRAVENOUS; SUBCUTANEOUS at 15:16

## 2020-05-28 RX ADMIN — HEPARIN SODIUM 5000 UNITS: 5000 INJECTION INTRAVENOUS; SUBCUTANEOUS at 23:00

## 2020-05-28 RX ADMIN — SODIUM CHLORIDE 75 ML/HR: 0.9 INJECTION, SOLUTION INTRAVENOUS at 23:44

## 2020-05-28 RX ADMIN — ACETAMINOPHEN 975 MG: 325 TABLET ORAL at 15:16

## 2020-05-28 RX ADMIN — DIAZEPAM 5 MG: 5 TABLET ORAL at 01:14

## 2020-05-28 RX ADMIN — LATANOPROST 1 DROP: 50 SOLUTION OPHTHALMIC at 23:38

## 2020-05-28 RX ADMIN — METHOCARBAMOL TABLETS 500 MG: 500 TABLET, COATED ORAL at 11:47

## 2020-05-28 RX ADMIN — DOCUSATE SODIUM 100 MG: 100 CAPSULE, LIQUID FILLED ORAL at 17:00

## 2020-05-28 RX ADMIN — ZOLPIDEM TARTRATE 5 MG: 5 TABLET, COATED ORAL at 23:38

## 2020-05-28 RX ADMIN — VANCOMYCIN HYDROCHLORIDE 1000 MG: 1 INJECTION, SOLUTION INTRAVENOUS at 05:43

## 2020-05-28 RX ADMIN — SENNOSIDES 8.6 MG: 8.6 TABLET, FILM COATED ORAL at 09:05

## 2020-05-28 RX ADMIN — LIDOCAINE 2 PATCH: 50 PATCH TOPICAL at 09:05

## 2020-05-28 RX ADMIN — ACETAMINOPHEN 975 MG: 325 TABLET ORAL at 23:37

## 2020-05-28 RX ADMIN — METHOCARBAMOL TABLETS 500 MG: 500 TABLET, COATED ORAL at 05:42

## 2020-05-28 RX ADMIN — SODIUM CHLORIDE 75 ML/HR: 0.9 INJECTION, SOLUTION INTRAVENOUS at 09:41

## 2020-05-28 RX ADMIN — ACETAMINOPHEN 975 MG: 325 TABLET ORAL at 05:42

## 2020-05-28 RX ADMIN — METHOCARBAMOL TABLETS 500 MG: 500 TABLET, COATED ORAL at 23:44

## 2020-05-28 RX ADMIN — VANCOMYCIN HYDROCHLORIDE 1000 MG: 1 INJECTION, SOLUTION INTRAVENOUS at 17:01

## 2020-05-28 RX ADMIN — METHOCARBAMOL TABLETS 500 MG: 500 TABLET, COATED ORAL at 17:00

## 2020-05-28 RX ADMIN — DOCUSATE SODIUM 100 MG: 100 CAPSULE, LIQUID FILLED ORAL at 09:05

## 2020-05-29 LAB
ABO GROUP BLD BPU: NORMAL
ABO GROUP BLD BPU: NORMAL
BPU ID: NORMAL
BPU ID: NORMAL
CROSSMATCH: NORMAL
CROSSMATCH: NORMAL
UNIT DISPENSE STATUS: NORMAL
UNIT DISPENSE STATUS: NORMAL
UNIT PRODUCT CODE: NORMAL
UNIT PRODUCT CODE: NORMAL
UNIT RH: NORMAL
UNIT RH: NORMAL

## 2020-05-29 PROCEDURE — 99024 POSTOP FOLLOW-UP VISIT: CPT | Performed by: NURSE PRACTITIONER

## 2020-05-29 RX ADMIN — OXYCODONE HYDROCHLORIDE 2.5 MG: 5 TABLET ORAL at 08:53

## 2020-05-29 RX ADMIN — SENNOSIDES 8.6 MG: 8.6 TABLET, FILM COATED ORAL at 08:54

## 2020-05-29 RX ADMIN — DOCUSATE SODIUM 100 MG: 100 CAPSULE, LIQUID FILLED ORAL at 08:54

## 2020-05-29 RX ADMIN — DOCUSATE SODIUM 100 MG: 100 CAPSULE, LIQUID FILLED ORAL at 17:32

## 2020-05-29 RX ADMIN — METHOCARBAMOL TABLETS 500 MG: 500 TABLET, COATED ORAL at 23:30

## 2020-05-29 RX ADMIN — VANCOMYCIN HYDROCHLORIDE 1000 MG: 1 INJECTION, SOLUTION INTRAVENOUS at 05:05

## 2020-05-29 RX ADMIN — METHOCARBAMOL TABLETS 500 MG: 500 TABLET, COATED ORAL at 17:32

## 2020-05-29 RX ADMIN — HEPARIN SODIUM 5000 UNITS: 5000 INJECTION INTRAVENOUS; SUBCUTANEOUS at 22:10

## 2020-05-29 RX ADMIN — ACETAMINOPHEN 975 MG: 325 TABLET ORAL at 13:08

## 2020-05-29 RX ADMIN — OXYCODONE HYDROCHLORIDE 5 MG: 5 TABLET ORAL at 02:50

## 2020-05-29 RX ADMIN — ZOLPIDEM TARTRATE 5 MG: 5 TABLET, COATED ORAL at 22:11

## 2020-05-29 RX ADMIN — METHOCARBAMOL TABLETS 500 MG: 500 TABLET, COATED ORAL at 11:09

## 2020-05-29 RX ADMIN — LATANOPROST 1 DROP: 50 SOLUTION OPHTHALMIC at 22:10

## 2020-05-29 RX ADMIN — METHOCARBAMOL TABLETS 500 MG: 500 TABLET, COATED ORAL at 05:09

## 2020-05-29 RX ADMIN — LIDOCAINE 2 PATCH: 50 PATCH TOPICAL at 08:54

## 2020-05-29 RX ADMIN — ACETAMINOPHEN 975 MG: 325 TABLET ORAL at 05:08

## 2020-05-29 RX ADMIN — ACETAMINOPHEN 975 MG: 325 TABLET ORAL at 22:10

## 2020-05-29 RX ADMIN — VANCOMYCIN HYDROCHLORIDE 1000 MG: 1 INJECTION, SOLUTION INTRAVENOUS at 17:33

## 2020-05-29 RX ADMIN — HEPARIN SODIUM 5000 UNITS: 5000 INJECTION INTRAVENOUS; SUBCUTANEOUS at 05:09

## 2020-05-29 RX ADMIN — HEPARIN SODIUM 5000 UNITS: 5000 INJECTION INTRAVENOUS; SUBCUTANEOUS at 13:08

## 2020-05-30 VITALS
OXYGEN SATURATION: 96 % | RESPIRATION RATE: 18 BRPM | TEMPERATURE: 97.8 F | DIASTOLIC BLOOD PRESSURE: 71 MMHG | HEART RATE: 80 BPM | BODY MASS INDEX: 26.61 KG/M2 | HEIGHT: 69 IN | SYSTOLIC BLOOD PRESSURE: 122 MMHG | WEIGHT: 179.68 LBS

## 2020-05-30 PROCEDURE — 99024 POSTOP FOLLOW-UP VISIT: CPT | Performed by: PHYSICIAN ASSISTANT

## 2020-05-30 PROCEDURE — 97166 OT EVAL MOD COMPLEX 45 MIN: CPT

## 2020-05-30 PROCEDURE — 97162 PT EVAL MOD COMPLEX 30 MIN: CPT

## 2020-05-30 RX ORDER — OXYCODONE HYDROCHLORIDE 5 MG/1
2.5-5 TABLET ORAL EVERY 4 HOURS PRN
Qty: 20 TABLET | Refills: 0 | Status: SHIPPED | OUTPATIENT
Start: 2020-05-30 | End: 2020-06-09

## 2020-05-30 RX ORDER — SENNOSIDES 8.6 MG
1 TABLET ORAL DAILY
Qty: 120 EACH | Refills: 0 | Status: SHIPPED | OUTPATIENT
Start: 2020-05-31 | End: 2020-06-16

## 2020-05-30 RX ORDER — LIDOCAINE 50 MG/G
2 PATCH TOPICAL DAILY
Qty: 10 PATCH | Refills: 0 | Status: SHIPPED | OUTPATIENT
Start: 2020-05-31 | End: 2020-06-16

## 2020-05-30 RX ORDER — METHOCARBAMOL 500 MG/1
500 TABLET, FILM COATED ORAL EVERY 6 HOURS SCHEDULED
Qty: 30 TABLET | Refills: 0 | Status: SHIPPED | OUTPATIENT
Start: 2020-05-30 | End: 2021-01-07

## 2020-05-30 RX ORDER — ACETAMINOPHEN 325 MG/1
975 TABLET ORAL EVERY 8 HOURS SCHEDULED
Qty: 30 TABLET | Refills: 0 | Status: SHIPPED | OUTPATIENT
Start: 2020-05-30

## 2020-05-30 RX ADMIN — METHOCARBAMOL TABLETS 500 MG: 500 TABLET, COATED ORAL at 13:05

## 2020-05-30 RX ADMIN — ACETAMINOPHEN 975 MG: 325 TABLET ORAL at 05:15

## 2020-05-30 RX ADMIN — DOCUSATE SODIUM 100 MG: 100 CAPSULE, LIQUID FILLED ORAL at 08:53

## 2020-05-30 RX ADMIN — METHOCARBAMOL TABLETS 500 MG: 500 TABLET, COATED ORAL at 05:15

## 2020-05-30 RX ADMIN — VANCOMYCIN HYDROCHLORIDE 1000 MG: 1 INJECTION, SOLUTION INTRAVENOUS at 05:16

## 2020-05-30 RX ADMIN — SENNOSIDES 8.6 MG: 8.6 TABLET, FILM COATED ORAL at 08:52

## 2020-05-30 RX ADMIN — LIDOCAINE 2 PATCH: 50 PATCH TOPICAL at 08:52

## 2020-05-30 RX ADMIN — HEPARIN SODIUM 5000 UNITS: 5000 INJECTION INTRAVENOUS; SUBCUTANEOUS at 05:15

## 2020-05-30 RX ADMIN — ACETAMINOPHEN 975 MG: 325 TABLET ORAL at 13:05

## 2020-06-01 ENCOUNTER — TELEPHONE (OUTPATIENT)
Dept: NEUROSURGERY | Facility: CLINIC | Age: 75
End: 2020-06-01

## 2020-06-01 ENCOUNTER — TELEMEDICINE (OUTPATIENT)
Dept: NEUROSURGERY | Facility: CLINIC | Age: 75
End: 2020-06-01

## 2020-06-01 DIAGNOSIS — Z98.890 POST-OPERATIVE STATE: Primary | ICD-10-CM

## 2020-06-01 PROCEDURE — 99024 POSTOP FOLLOW-UP VISIT: CPT

## 2020-06-02 ENCOUNTER — TELEPHONE (OUTPATIENT)
Dept: NEUROSURGERY | Facility: CLINIC | Age: 75
End: 2020-06-02

## 2020-06-16 ENCOUNTER — OFFICE VISIT (OUTPATIENT)
Dept: NEUROSURGERY | Facility: CLINIC | Age: 75
End: 2020-06-16

## 2020-06-16 VITALS
BODY MASS INDEX: 24.72 KG/M2 | WEIGHT: 166.9 LBS | SYSTOLIC BLOOD PRESSURE: 115 MMHG | HEIGHT: 69 IN | TEMPERATURE: 97.3 F | RESPIRATION RATE: 16 BRPM | DIASTOLIC BLOOD PRESSURE: 80 MMHG | HEART RATE: 72 BPM

## 2020-06-16 DIAGNOSIS — Z09 POSTOPERATIVE EXAMINATION: Primary | ICD-10-CM

## 2020-06-16 PROCEDURE — 4040F PNEUMOC VAC/ADMIN/RCVD: CPT | Performed by: NEUROLOGICAL SURGERY

## 2020-06-16 PROCEDURE — 99024 POSTOP FOLLOW-UP VISIT: CPT | Performed by: NEUROLOGICAL SURGERY

## 2020-06-16 PROCEDURE — 3008F BODY MASS INDEX DOCD: CPT | Performed by: PHYSICIAN ASSISTANT

## 2020-06-16 PROCEDURE — 1111F DSCHRG MED/CURRENT MED MERGE: CPT | Performed by: NEUROLOGICAL SURGERY

## 2020-06-16 PROCEDURE — 1160F RVW MEDS BY RX/DR IN RCRD: CPT | Performed by: NEUROLOGICAL SURGERY

## 2020-06-18 ENCOUNTER — EVALUATION (OUTPATIENT)
Dept: PHYSICAL THERAPY | Facility: CLINIC | Age: 75
End: 2020-06-18
Payer: COMMERCIAL

## 2020-06-18 DIAGNOSIS — Z98.890 POST-OPERATIVE STATE: ICD-10-CM

## 2020-06-18 DIAGNOSIS — Q85.03 SCHWANNOMATOSIS (HCC): Primary | ICD-10-CM

## 2020-06-18 PROCEDURE — 97162 PT EVAL MOD COMPLEX 30 MIN: CPT | Performed by: PHYSICAL THERAPIST

## 2020-06-18 PROCEDURE — 97110 THERAPEUTIC EXERCISES: CPT | Performed by: PHYSICAL THERAPIST

## 2020-06-19 ENCOUNTER — OFFICE VISIT (OUTPATIENT)
Dept: PHYSICAL THERAPY | Age: 75
End: 2020-06-19
Payer: COMMERCIAL

## 2020-06-19 DIAGNOSIS — Q85.03 SCHWANNOMATOSIS (HCC): Primary | ICD-10-CM

## 2020-06-19 PROCEDURE — 97110 THERAPEUTIC EXERCISES: CPT | Performed by: PHYSICAL THERAPIST

## 2020-08-25 ENCOUNTER — TELEPHONE (OUTPATIENT)
Dept: NEUROSURGERY | Facility: CLINIC | Age: 75
End: 2020-08-25

## 2020-08-25 DIAGNOSIS — M54.16 LUMBAR RADICULOPATHY: ICD-10-CM

## 2020-08-25 DIAGNOSIS — D49.7 INTRADURAL EXTRAMEDULLARY SPINAL TUMOR: ICD-10-CM

## 2020-08-25 DIAGNOSIS — Z98.890 POST-OPERATIVE STATE: Primary | ICD-10-CM

## 2020-08-25 NOTE — TELEPHONE ENCOUNTER
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 Thank you

## 2020-08-25 NOTE — TELEPHONE ENCOUNTER
Please obtain a new MRI of the LS spine with and without contrast     Also a Medrol dose pack which may be effective in eliminating symptoms in the mean time    ty

## 2020-08-26 NOTE — TELEPHONE ENCOUNTER
Patient returned the call  Notified him of Dr Khushbu Jones recommendations  Patient requested to hold off on the medrol dose pack  He wants the MRI lumbar spine  Scheduled patient for the MRI on 9/2/20 at [facility]  Scheduled patient to be seen by a PA-SAVANNAH on a Dr Ralf Juarezider day on 9/8/20 at 2:30 pm      Patient was appreciative

## 2020-08-26 NOTE — TELEPHONE ENCOUNTER
Called patient to notify him of Dr Rom Diaz recommendations and to help patient arrange  Patient did not answer  Left a voice message requesting for patient to call back  Provided my direct line

## 2020-09-08 NOTE — TELEPHONE ENCOUNTER
Patient CX'd MRI, attempted to reach patient, [facility] asked for return call to office and provided office number to let us know if he wishes to [facility] MRI and office appointment now or is waiting until December

## 2020-12-10 DIAGNOSIS — D49.7 INTRADURAL EXTRAMEDULLARY SPINAL TUMOR: Primary | ICD-10-CM

## 2020-12-10 DIAGNOSIS — Q85.03 SCHWANNOMATOSIS (HCC): ICD-10-CM

## 2021-01-06 ENCOUNTER — HOSPITAL ENCOUNTER (OUTPATIENT)
Dept: RADIOLOGY | Age: 76
Discharge: HOME | End: 2021-01-06
Payer: COMMERCIAL

## 2021-01-06 DIAGNOSIS — Z09 POSTOPERATIVE EXAMINATION: ICD-10-CM

## 2021-01-06 PROCEDURE — 72157 MRI CHEST SPINE W/O & W/DYE: CPT

## 2021-01-06 PROCEDURE — A9585 GADOBUTROL INJECTION: HCPCS | Performed by: NEUROLOGICAL SURGERY

## 2021-01-06 PROCEDURE — 72158 MRI LUMBAR SPINE W/O & W/DYE: CPT

## 2021-01-06 RX ADMIN — GADOBUTROL 7 ML: 604.72 INJECTION INTRAVENOUS at 15:30

## 2021-01-07 ENCOUNTER — TELEMEDICINE (OUTPATIENT)
Dept: NEUROSURGERY | Facility: CLINIC | Age: 76
End: 2021-01-07
Payer: COMMERCIAL

## 2021-01-07 VITALS — BODY MASS INDEX: 24.88 KG/M2 | HEIGHT: 69 IN | WEIGHT: 168 LBS

## 2021-01-07 DIAGNOSIS — M54.16 LUMBAR RADICULOPATHY: ICD-10-CM

## 2021-01-07 DIAGNOSIS — Q85.03 SCHWANNOMATOSIS (HCC): Primary | ICD-10-CM

## 2021-01-07 PROCEDURE — 1160F RVW MEDS BY RX/DR IN RCRD: CPT | Performed by: NEUROLOGICAL SURGERY

## 2021-01-07 PROCEDURE — 3008F BODY MASS INDEX DOCD: CPT | Performed by: NEUROLOGICAL SURGERY

## 2021-01-07 PROCEDURE — 99212 OFFICE O/P EST SF 10 MIN: CPT | Performed by: NEUROLOGICAL SURGERY

## 2021-01-07 PROCEDURE — 1036F TOBACCO NON-USER: CPT | Performed by: NEUROLOGICAL SURGERY

## 2021-01-07 RX ORDER — SIMVASTATIN 40 MG
40 TABLET ORAL DAILY
COMMUNITY
Start: 2020-08-13 | End: 2021-02-09

## 2021-01-07 RX ORDER — OMEGA-3S/DHA/EPA/FISH OIL/D3 300MG-1000
400 CAPSULE ORAL DAILY
COMMUNITY

## 2021-01-07 NOTE — PROGRESS NOTES
Virtual Regular Visit      Assessment/Plan: This is a 23-year-old gentleman with multiple known schwannomas  The thoracic lower level schwannoma is approximately 7 mm in size  The patient had a lumbar radiculopathy which occurred following stretching and then resolved spontaneously  He knows to call if his symptoms recur and are sustained  Otherwise checking for these mass lesions over time is important to ensure that there is no worsening  We discussed the need for continuous physical therapy exercises done on his own at home on a regular basis    Problem List Items Addressed This Visit        Nervous and Auditory    Lumbar radiculopathy    Relevant Orders    MRI thoracic spine with and without contrast    MRI lumbar spine with and without contrast    Schwannomatosis ([facility] [address] ) - Primary    Relevant Orders    MRI thoracic spine with and without contrast    MRI lumbar spine with and without contrast               Reason for visit is   Chief Complaint   Patient presents with    Virtual Regular Visit     Follow up w/MRI thoracic and lumbar 1/6/21 for worsening right leg sciatica pain since the surgery  Patient had a L1-2 and L4-5 laminectomy for resection of intradural extramedullary masses on 5/27/20  Encounter provider Meryle Pluck, MD    Provider located at [address] [address] [phone]      Recent Visits  No visits were found meeting these conditions  Showing recent visits within past 7 days and meeting all other requirements     Today's Visits  Date Type Provider Dept   01/07/21 Telemedicine Meryle Pluck, [facility] today's visits and meeting all other requirements     Future Appointments  No visits were found meeting these conditions     Showing future appointments within next 150 days and meeting all other requirements        The patient was identified by name and date of birth  Madeleine Martinez was informed that this is a telemedicine visit and that the visit is being conducted through [facility] and patient was informed that this is not a secure, HIPAA-compliant platform  He agrees to proceed     My office door was closed  No one else was in the room  He acknowledged consent and understanding of privacy and security of the video platform  The patient has agreed to participate and understands they can discontinue the visit at any time  Patient is aware this is a billable service  Kenji Martinez is a 77 y o  male who was stretching he developed an acute radiculopathy  This radiculopathy is since resolved  Imaging studies done while it was still symptomatic demonstrated schwannomas  He denies bowel or bladder difficulties  He does have occasional back pain  He is not doing his stretching exercises  When he does the stretching exercises his pain is much better controlled           Past Medical History:   Diagnosis Date    Arthritis     High cholesterol        Past Surgical History:   Procedure Laterality Date    BACK SURGERY      COLONOSCOPY      HERNIA REPAIR      INCISION AND DRAINAGE ABSCESS / HEMATOMA OF BURSA / KNEE / THIGH      VA BX/EXCIS SPIN CATHI,INDUR,XMED,LUMB N/A 5/27/2020    Procedure: L1-2 and L4-5 laminectomy for resection of intradural extramedullary masses;  Surgeon: Rishi Martins MD;  [facility];  Service: Neurosurgery    SHOULDER SURGERY Right        Current Outpatient Medications   Medication Sig Dispense Refill    acetaminophen (TYLENOL) 325 mg tablet Take 3 tablets (975 mg total) by mouth every 8 (eight) hours (Patient taking differently: Take 975 mg by mouth as needed ) 30 tablet 0    Ascorbic Acid (VITAMIN C) 500 MG CAPS Take by mouth daily as needed       cholecalciferol (VITAMIN D3) 400 units tablet Take 400 Units by mouth daily      fluticasone (FLONASE) 50 mcg/act nasal spray 1 spray into each nostril as needed for rhinitis      latanoprost (XALATAN) 0 005 % ophthalmic solution 3 (three) times a week   4    sildenafil (VIAGRA) 100 mg tablet Take 1 tablet (100 mg total) by mouth daily as needed for erectile dysfunction 10 tablet 0    simvastatin (ZOCOR) 40 mg tablet Take 40 mg by mouth daily      vitamin E 600 UNIT capsule Take by mouth daily as needed       No current facility-administered medications for this visit  No Known Allergies    Review of Systems   Constitutional: Negative  HENT: Negative  Eyes: Negative  Respiratory: Negative  Cardiovascular: Negative  Gastrointestinal: Negative  Endocrine: Negative  Genitourinary: Negative  Musculoskeletal: Positive for back pain (lower right w/positioning; once/week)  Negative for myalgias (right leg sciatica pain has resolved)  Skin: Negative  Allergic/Immunologic: Negative  Neurological: Negative  Hematological: Negative  Psychiatric/Behavioral: Negative  All other systems reviewed and are negative  I reviewed the ROS  Video Exam    Vitals:    01/07/21 1513   Weight: 76 2 kg (168 lb)   Height: 5' 9" (1 753 m)       Physical Exam   Awake and alert  Speech is clear and comprehensible  He is in no apparent distress  He is riding in a vehicle    I spent 30 minutes directly with the patient during this visit   MRI of the lumbar and thoracic spines are reviewed  The right panel demonstrates the lower aspect of the thoracic spine where a 7 mm contrast-enhancing mass consistent with a schwannoma is identified  The patient is currently asymptomatic with this  VIRTUAL VISIT [address] [place] [address] acknowledges that he has consented to an online visit or consultation   He understands that the online visit is based solely on information provided by him, and that, in the absence of a face-to-face physical evaluation by the physician, the diagnosis he receives is both limited and provisional in terms of accuracy and completeness  This is not intended to replace a full medical face-to-face evaluation by the physician  Adriane Daigle understands and accepts these terms

## 2021-01-08 ENCOUNTER — TELEPHONE (OUTPATIENT)
Dept: NEUROSURGERY | Facility: CLINIC | Age: 76
End: 2021-01-08

## 2021-01-08 NOTE — TELEPHONE ENCOUNTER
Patient called the office reporting he had an appointment with Dr Severiano Pesa yesterday, but would like to know what is the cause of the schwannomas  Informed patient the cause of schwannomas is not known in most cases  Patient would like a biopsy of the masses  Explained to patient how Dr Severiano Pesa did obtain pathology on 5/27/20 when patient underwent a L1-2 and L4-5 laminectomy for resection of intradural extramedullary masses  Pathology is in chart  Patient inquired if his diet can increase the growth of the schwannomas  Explained to patient how this RN is unaware if the diet causes growth of the schwannomas  Patient is anxious regarding the schwannomas and would like to try to stop them before they grow  Reviewed Dr Naif Villagomez recommendation (stated in his last office visit note) to continue with surveillance of these masses, continuous physical therapy done in his own home at a regular basis, and for patient to call with worsening signs/symptoms  Patient said he does not want to wait one year for repeat imaging and he is still interested in a biopsy  Offered patient to seek a second opinion  Patient wishes to go to [facility]  Informed patient this RN will print out his neurosurgery medical records and obtain a disc of his imaging  Patient will  these records/disc in the [place] office on Monday 1/11/21  He will call [facility] to arrange an appointment  He will notify the office if he needs the office to fax his records to [facility]  Provided my direct line  He was very appreciative

## 2021-01-11 NOTE — TELEPHONE ENCOUNTER
Patient called the office reporting he spoke with someone at [place] for about 10 minutes and also told him the cause of schwannomas is not known in most cases  They did tell the patient to watch diary intake and watch for saturated fats  They encouraged him to eat more greens and to avoid ice cream  Patient reports he decided he does not need a second opinion appointment since he received the information above  He was appreciative  Notified patient how this RN will forward to [facility] check out staff, for her to reach out to the patient to schedule the MRIs and a follow up appointment with Dr Alexander Roach in one year  He was appreciative  [facility]- Patient had a virtual appointment on 1/7/21 with Dr Alexander Roach recommended for a follow up appointment in 1 year with MRI of lumbar and thoracic spine  Please schedule MRIs and reach out to patient to schedule a follow up appointment with Dr Alexander Roach  Thanks

## 2021-01-26 NOTE — TELEPHONE ENCOUNTER
Patient called the office inquiring what causes schwannomas to grow and how he would like a referral to neurology at [place] [address] to determine the cause  Patient reports he spoke with the neurology office and they said he needed a referral to be seen  To note, patient called the office on 1/18/21 inquiring the for the schwannomas and he called [facility] for a second opinion  Kristen spoke with him over the phone per patient and told him the cause of schwannomas is not known in most cases, but to watch intake of diary and saturated fats  Patient decided there was no need for the second opinion appointment  Patient now would like a referral to neurology  Informed patient this RN will send a message to Dr Bre Rodgers to see if he would be okay with a referral to neurology  Patient was appreciative  Discussed with Gregory Salazar and she did not have any further input regarding the cause of schwannomas  Dr Bre Rodgers- okay to place referral to neurology and/or any other thoughts? Thanks

## 2021-01-29 NOTE — TELEPHONE ENCOUNTER
Patient called the office and left a voice message stating he spoke to an "outside provider" and they recommended RT to his schwannomas  Patient would like Dr Frank Fitzpatrick opinion about this  Routing to Dr Jessica Varma for his thoughts

## 2021-02-04 NOTE — TELEPHONE ENCOUNTER
Called patient and notified him of Dr Ambrose Bustamante response  Patient expressed understanding and was appreciative

## 2021-03-02 DIAGNOSIS — Z23 ENCOUNTER FOR IMMUNIZATION: ICD-10-CM

## 2021-03-05 ENCOUNTER — IMMUNIZATIONS (OUTPATIENT)
Dept: FAMILY MEDICINE CLINIC | Facility: HOSPITAL | Age: 76
End: 2021-03-05

## 2021-03-05 DIAGNOSIS — Z23 ENCOUNTER FOR IMMUNIZATION: Primary | ICD-10-CM

## 2021-03-05 PROCEDURE — 91300 SARS-COV-2 / COVID-19 MRNA VACCINE (PFIZER-BIONTECH) 30 MCG: CPT

## 2021-03-05 PROCEDURE — 0001A SARS-COV-2 / COVID-19 MRNA VACCINE (PFIZER-BIONTECH) 30 MCG: CPT

## 2021-03-26 ENCOUNTER — IMMUNIZATIONS (OUTPATIENT)
Dept: FAMILY MEDICINE CLINIC | Facility: HOSPITAL | Age: 76
End: 2021-03-26

## 2021-03-26 DIAGNOSIS — Z23 ENCOUNTER FOR IMMUNIZATION: Primary | ICD-10-CM

## 2021-03-26 PROCEDURE — 91300 SARS-COV-2 / COVID-19 MRNA VACCINE (PFIZER-BIONTECH) 30 MCG: CPT

## 2021-03-26 PROCEDURE — 0002A SARS-COV-2 / COVID-19 MRNA VACCINE (PFIZER-BIONTECH) 30 MCG: CPT

## 2021-05-25 ENCOUNTER — TELEPHONE (OUTPATIENT)
Dept: NEUROSURGERY | Facility: CLINIC | Age: 76
End: 2021-05-25

## 2021-05-25 NOTE — TELEPHONE ENCOUNTER
Received a call from Anthony Parikh requesting follow-up with Dr Mary Bansal  He states he slipped on the floor in his kitchen and fell on to his right hip about 3-4 months ago  He states that since then has noticed a lump in his hip and buttock region and feels something might be pinched in his hip  Suggested he communicate with PCP regarding this to see if imaging is needed and perhaps ortho  He was appreciative of the direction

## 2021-12-28 ENCOUNTER — TELEPHONE (OUTPATIENT)
Dept: NEUROSURGERY | Facility: CLINIC | Age: 76
End: 2021-12-28

## 2022-01-04 ENCOUNTER — TELEPHONE (OUTPATIENT)
Dept: OBGYN CLINIC | Facility: HOSPITAL | Age: 77
End: 2022-01-04

## 2022-01-04 NOTE — TELEPHONE ENCOUNTER
Patient called today to confirm his appointment times for his MRI  I advised him 8:30 and 9:15 AM  He has two separate MRIs scheduled

## 2022-01-05 ENCOUNTER — HOSPITAL ENCOUNTER (OUTPATIENT)
Dept: RADIOLOGY | Age: 77
Discharge: HOME | End: 2022-01-05
Payer: COMMERCIAL

## 2022-01-05 DIAGNOSIS — Q85.03 SCHWANNOMATOSIS (HCC): ICD-10-CM

## 2022-01-05 DIAGNOSIS — M54.16 LUMBAR RADICULOPATHY: ICD-10-CM

## 2022-01-05 PROCEDURE — G1004 CDSM NDSC: HCPCS

## 2022-01-05 PROCEDURE — A9585 GADOBUTROL INJECTION: HCPCS | Performed by: NEUROLOGICAL SURGERY

## 2022-01-05 PROCEDURE — 72158 MRI LUMBAR SPINE W/O & W/DYE: CPT

## 2022-01-05 PROCEDURE — 72157 MRI CHEST SPINE W/O & W/DYE: CPT

## 2022-01-05 RX ADMIN — GADOBUTROL 7 ML: 604.72 INJECTION INTRAVENOUS at 10:01

## 2022-01-11 ENCOUNTER — OFFICE VISIT (OUTPATIENT)
Dept: NEUROSURGERY | Facility: CLINIC | Age: 77
End: 2022-01-11
Payer: COMMERCIAL

## 2022-01-11 VITALS
WEIGHT: 176 LBS | RESPIRATION RATE: 16 BRPM | HEIGHT: 69 IN | TEMPERATURE: 97.2 F | BODY MASS INDEX: 26.07 KG/M2 | DIASTOLIC BLOOD PRESSURE: 88 MMHG | HEART RATE: 64 BPM | SYSTOLIC BLOOD PRESSURE: 139 MMHG

## 2022-01-11 DIAGNOSIS — Q85.03 SCHWANNOMATOSIS (HCC): Primary | ICD-10-CM

## 2022-01-11 DIAGNOSIS — G95.89 SPINAL CORD MASS (HCC): ICD-10-CM

## 2022-01-11 PROCEDURE — 99213 OFFICE O/P EST LOW 20 MIN: CPT | Performed by: NEUROLOGICAL SURGERY

## 2022-01-11 RX ORDER — SIMVASTATIN 20 MG
TABLET ORAL
COMMUNITY
Start: 2021-11-14

## 2022-01-11 RX ORDER — OMEGA-3/DHA/EPA/FISH OIL 60 MG-90MG
CAPSULE ORAL
COMMUNITY

## 2022-01-11 RX ORDER — CETIRIZINE HYDROCHLORIDE 10 MG/1
10 TABLET ORAL DAILY
COMMUNITY

## 2022-01-11 RX ORDER — GABAPENTIN 300 MG/1
CAPSULE ORAL
COMMUNITY
Start: 2021-12-14

## 2022-01-11 NOTE — PATIENT INSTRUCTIONS
Patient knows to call if he develops intractable pain especially on the right side in the low back radiating to the abdominal region

## 2022-01-11 NOTE — PROGRESS NOTES
DISCUSSION SUMMARY  This is a 77 y o  male who is being followed for schwannomatosis  Serial imaging studies demonstrate no regrowth of tumor at this point  Will plan on seeing him back in the office in 2 years time or sooner should he become increasingly symptomatic from this tumor at T11  Return in about 2 years (around 1/11/2024) for follow-up after test         Diagnosis ICD-10-CM Associated Orders   1  Schwannomatosis ([facility] [address] )  Q85 03 MRI thoracic spine with and without contrast   2  Spinal cord mass [facility])  G95 89           Chief Complaint   Patient presents with    Follow-up     1 Year F/u for Lumbar radiculopathy, S/p ([facility]) L1-2 and L4-5 laminectomy for resection of intradural extramedullary masses [5/2020]; MRI T & L Spine both on 1/5/22       HPI patient is be following for schwannomatosis  He is status post resection of multiple larger tumors in the past peer he now does complain of some right low back pain which is in the T11 dermatome  This discomfort described as a pressure is minimally impactful on his life  He works out on a regular basis  He believes this is not impacting the quality of his life  He exercises on a daily basis increasing his heart rate to the 145 range without difficulty  Review of Systems   Constitutional: Negative  HENT: Negative  Eyes: Negative  Respiratory: Negative  Cardiovascular: Negative  Gastrointestinal: Negative  Endocrine: Negative  Genitourinary: Negative  Negative for frequency and urgency  Musculoskeletal: Positive for back pain (lower right w/positioning; once/week )  Negative for arthralgias (stiffness in lower back), gait problem and myalgias (right leg sciatica pain has resolved)  No PT but continues exercises 2-3 weeks; Goes to gym every other day    Denies recent falls or trauma     Skin: Negative  Allergic/Immunologic: Negative  Neurological: Negative  Negative for weakness and numbness     Hematological: Negative  Psychiatric/Behavioral: Negative  I reviewed the ROS        Vitals:    /88 (BP Location: Left arm, Patient Position: Sitting, Cuff Size: Standard)   Pulse 64   Temp (!) 97 2 °F (36 2 °C) (Probe)   Resp 16   Ht 5' 9" (1 753 m)   Wt 79 8 kg (176 lb)   BMI 25 99 kg/m²       MEDICAL HISTORY  Past Medical History:   Diagnosis Date    Arthritis     High cholesterol      Past Surgical History:   Procedure Laterality Date    BACK SURGERY      COLONOSCOPY      HERNIA REPAIR      INCISION AND DRAINAGE ABSCESS / HEMATOMA OF BURSA / KNEE / THIGH      LA BX/EXCIS SPIN CATHI,INDUR,XMED,LUMB N/A 5/27/2020    Procedure: L1-2 and L4-5 laminectomy for resection of intradural extramedullary masses;  Surgeon: Tony Kuhn MD;  [facility];  Service: Neurosurgery    SHOULDER SURGERY Right      Social History     Tobacco Use    Smoking status: Never Smoker    Smokeless tobacco: Never Used   Vaping Use    Vaping Use: Never used   Substance Use Topics    Alcohol use: Yes     Comment: 5 Drinks a week    Drug use: No        Current Outpatient Medications:     Ascorbic Acid (VITAMIN C) 500 MG CAPS, Take by mouth daily as needed , Disp: , Rfl:     cetirizine (ZyrTEC) 10 mg tablet, Take 10 mg by mouth daily, Disp: , Rfl:     cholecalciferol (VITAMIN D3) 400 units tablet, Take 400 Units by mouth daily, Disp: , Rfl:     cyanocobalamin (VITAMIN B-12) 500 MCG tablet, Take 500 mcg by mouth daily, Disp: , Rfl:     fluticasone (FLONASE) 50 mcg/act nasal spray, 1 spray into each nostril as needed for rhinitis, Disp: , Rfl:     gabapentin (NEURONTIN) 300 mg capsule, , Disp: , Rfl:     latanoprost (XALATAN) 0 005 % ophthalmic solution, 3 (three) times a week , Disp: , Rfl: 4    Omega-3 Fatty Acids (Fish Oil) 500 MG CAPS, Take by mouth, Disp: , Rfl:     simvastatin (ZOCOR) 20 mg tablet, , Disp: , Rfl:     vitamin E 600 UNIT capsule, Take by mouth daily as needed, Disp: , Rfl:     acetaminophen (TYLENOL) 325 mg tablet, Take 3 tablets (975 mg total) by mouth every 8 (eight) hours (Patient not taking: Reported on 1/11/2022 ), Disp: 30 tablet, Rfl: 0    sildenafil (VIAGRA) 100 mg tablet, Take 1 tablet (100 mg total) by mouth daily as needed for erectile dysfunction (Patient not taking: Reported on 1/11/2022 ), Disp: 10 tablet, Rfl: 0   No Known Allergies     The following portions of the patient's history were updated by MA and reviewed by MD: allergies, current medications, past family history, past medical history, past social history, past surgical history and problem list       Physical Exam  Vitals and nursing note reviewed  Constitutional:       General: He is not in acute distress  Appearance: Normal appearance  He is normal weight  He is not ill-appearing, toxic-appearing or diaphoretic  HENT:      Head: Normocephalic and atraumatic  Nose: Nose normal    Eyes:      Extraocular Movements: Extraocular movements intact  Pupils: Pupils are equal, round, and reactive to light  Musculoskeletal:         General: No swelling, tenderness, deformity or signs of injury  Normal range of motion  Cervical back: Normal range of motion and neck supple  Right lower leg: No edema  Left lower leg: No edema  Skin:     General: Skin is warm and dry  Neurological:      General: No focal deficit present  Mental Status: He is alert and oriented to person, place, and time  Mental status is at baseline  Cranial Nerves: No cranial nerve deficit  Sensory: No sensory deficit  Motor: No weakness  Coordination: Coordination normal       Gait: Gait ( ) normal       Deep Tendon Reflexes: Reflexes normal    Psychiatric:         Mood and Affect: Mood normal          Behavior: Behavior normal          Thought Content:  Thought content normal          Judgment: Judgment normal            RESULTS/DATA  I have personally reviewed pertinent films in PACS   MRI of thoracic spine is carefully reviewed and compared to a study from April 2020  This demonstrates no significant change in the size of the T10-11 contrast-enhancing mass which is likely a schwannoma  There is no return of the schwannomas that have been resected

## 2022-06-14 ENCOUNTER — TELEPHONE (OUTPATIENT)
Dept: NEUROSURGERY | Facility: CLINIC | Age: 77
End: 2022-06-14

## 2022-06-14 DIAGNOSIS — Z01.812 ENCOUNTER FOR PREPROCEDURAL LABORATORY EXAMINATION: Primary | ICD-10-CM

## 2022-06-14 NOTE — TELEPHONE ENCOUNTER
Left message for return call in regards to scheduling concerns patient had yesterday  Appointment was scheduled for the future with an AP in regards to a workup of symptoms he is currently having  Patient was scheduled solo AP for the time being since there is no recent imaging  Patient insisted on seeing Dr Tom Mendoza solo in office  I sent out a message to Dr Tom Mendoza in regards to this matter and he agreed with this plan  Patient is to see AP for work up  If patient can obtain imaging ordered by his PCP for his symptoms then we can bring him in solo with [facility]

## 2022-06-15 NOTE — TELEPHONE ENCOUNTER
===View-only below this line===  ----- Message -----  From: Faiza Puckett MD  Sent: 6/15/2022   2:35 PM EDT  To: Génesis Grace  Subject: imaging                                          Per [facility],  This patient needs a repeat MRI of the LsSpine    --------------------------------------------------------      LVM relaying [facility]'s response

## 2022-06-16 NOTE — TELEPHONE ENCOUNTER
6/16/22 PT CALLED BACK AND STATES HE IS GOING TO ASK PCP TO ORDER MRI, HE ASKED WHAT KIND OF MRI, I ADVISED MRI LSPINE W/WO THAT HE HAS HAD IN PAST  PT HAS APPT 7/8/22 WITH AP FOR WORK UP FOR MRI ORDER, PT WILL CALL BACK IF PCP WILING TO ORDER MRI, TO ADVISED WHEN ITS SCHEDULED AND CHANGE APPT FROM AP TO [facility] TO REVIEW

## 2022-07-06 NOTE — TELEPHONE ENCOUNTER
Transferred date/time from MRI thoracic spine to lumbar spine via central scheduling  Patient also needs a bun and creatinine prior to MRI to assess kidney function  Called patient to notify him of the above  Patient did not answer  Left a detailed vm providing an update  Orders were placed  Provided the office's phone number for the patient to return the call if he has any additional questions or concerns

## 2022-07-06 NOTE — TELEPHONE ENCOUNTER
Patient called the office reporting some confusion with his follow up plan  Extensively reviewed chart  Advised patient how Dr Billy Pineda recommended for a 2 year follow up with a MRI thoracic spine or to return sooner with new/worsening symptoms  Patient previously called the office reporting worsening lower back pain on his right side  At that time, patient was scheduled for a follow up with an AP for a work up, unless the PCP orders an MRI  PCP ordered an MRI lumbar spine  Dr Billy Pineda also recommended for an MRI lumbar spine  Per chart review, looks like the MRI thoracic spine is scheduled instead of the lumbar spine  Patient reports how that was a mistake by central scheduling  Offered to move the date/time of the thoracic spine to the lumbar spine MRI by calling central scheduling  Patient agreed to plan  Also canceled appointment with Leanne Cartagena and scheduled patient an appointment with Dr Bilyl Pineda since imaging will be performed  Patient is aware this RN will call him back once the date/time is transferred from the MRI thoracic spine to lumbar spine  He was appreciative for coordination

## 2022-10-25 ENCOUNTER — APPOINTMENT (OUTPATIENT)
Dept: LAB | Age: 77
End: 2022-10-25
Payer: COMMERCIAL

## 2022-10-25 DIAGNOSIS — Z13.6 SCREENING FOR CARDIOVASCULAR CONDITION: ICD-10-CM

## 2022-10-25 DIAGNOSIS — Z12.5 SPECIAL SCREENING FOR MALIGNANT NEOPLASM OF PROSTATE: ICD-10-CM

## 2022-10-25 LAB
CHOLEST SERPL-MCNC: 174 MG/DL
HDLC SERPL-MCNC: 64 MG/DL
LDLC SERPL CALC-MCNC: 96 MG/DL (ref 0–100)
NONHDLC SERPL-MCNC: 110 MG/DL
PSA SERPL-MCNC: 2.1 NG/ML (ref 0–4)
TRIGL SERPL-MCNC: 70 MG/DL

## 2022-10-25 PROCEDURE — 36415 COLL VENOUS BLD VENIPUNCTURE: CPT

## 2022-10-25 PROCEDURE — G0103 PSA SCREENING: HCPCS

## 2022-10-25 PROCEDURE — 80061 LIPID PANEL: CPT

## 2023-01-03 ENCOUNTER — HOSPITAL ENCOUNTER (OUTPATIENT)
Dept: RADIOLOGY | Age: 78
Discharge: HOME | End: 2023-01-03

## 2023-01-03 DIAGNOSIS — M54.40 LOW BACK PAIN WITH SCIATICA, SCIATICA LATERALITY UNSPECIFIED, UNSPECIFIED BACK PAIN LATERALITY, UNSPECIFIED CHRONICITY: ICD-10-CM

## 2023-01-03 DIAGNOSIS — D33.4 BENIGN NEOPLASM OF SPINAL CORD (HCC): ICD-10-CM

## 2023-01-03 RX ADMIN — GADOBUTROL 8 ML: 604.72 INJECTION INTRAVENOUS at 12:13

## 2023-01-24 ENCOUNTER — TELEPHONE (OUTPATIENT)
Dept: PAIN MEDICINE | Facility: CLINIC | Age: 78
End: 2023-01-24

## 2023-01-24 ENCOUNTER — OFFICE VISIT (OUTPATIENT)
Dept: NEUROSURGERY | Facility: CLINIC | Age: 78
End: 2023-01-24

## 2023-01-24 VITALS
RESPIRATION RATE: 16 BRPM | HEART RATE: 75 BPM | DIASTOLIC BLOOD PRESSURE: 85 MMHG | WEIGHT: 169 LBS | HEIGHT: 68 IN | BODY MASS INDEX: 25.61 KG/M2 | TEMPERATURE: 97.9 F | SYSTOLIC BLOOD PRESSURE: 133 MMHG

## 2023-01-24 DIAGNOSIS — M47.816 SPONDYLOSIS WITHOUT MYELOPATHY OR RADICULOPATHY, LUMBAR REGION: Primary | ICD-10-CM

## 2023-01-24 DIAGNOSIS — G95.89 SPINAL CORD MASS (HCC): ICD-10-CM

## 2023-01-24 NOTE — PROGRESS NOTES
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)  [id number]            Chief Complaint    F/u for worsening lower back pain; MRI lumbar spine 1/3/23     HPI - This patient is being followed for schwannomatosis  He is status post L1-2 and L4-5 laminectomy for resection of intradural extramedullary masses (5/27/20 DKO)  Last seen on 1/11/22 (DKO)  Serial imaging studies demonstrate no regrowth of tumor at this point  Planned for a follow-up in 2 years’ time or sooner should he become increasingly symptomatic from this tumor at T11  Patient reached out to the office in June 2022 requesting an appointment to review his symptoms and we recommended a repeat MRI Lspine to further evaluate  He complains of low back pain which does not radiate  This is more severe when he is exercising more  He was concerned that his schwannomatosis had gotten worse  He does have a history of schwannomas which I resected 3 years ago and he has done well since that time  He complains of back pain which is relatively unchanged from last year  He denies difficulty with bowel or bladder  He denies having falls or having his legs give out  He has not had any motor vehicle collisions  He does workout in the gym 2-3 times a week  He has not had epidural steroid injections  Review of Systems   Constitutional: Negative  HENT: Negative  Eyes: Negative  Respiratory: Negative      Cardiovascular: Negative  Gastrointestinal: Negative  Endocrine: Negative  Genitourinary: Negative  Musculoskeletal: Positive for back pain (mild-moderate low back pain; unchanged since last visit 1/11/22)  Skin: Negative  Allergic/Immunologic: Negative  Neurological: Negative  Hematological: Negative  Psychiatric/Behavioral: Negative  All other systems reviewed and are negative  I reviewed the ROS      Vitals:    /85 (BP Location: Right arm, Patient Position: Sitting, Cuff Size: Standard)   Pulse 75   Temp 97 9 °F (36 6 °C) (Temporal)   Resp 16   Ht 5' 8" (1 727 m)   Wt 76 7 kg (169 lb)   BMI 25 70 kg/m²     MEDICAL HISTORY  Past Medical History:   Diagnosis Date   • Arthritis    • High cholesterol      Past Surgical History:   Procedure Laterality Date   • BACK SURGERY     • COLONOSCOPY     • HERNIA REPAIR     • INCISION AND DRAINAGE ABSCESS / HEMATOMA OF BURSA / KNEE / THIGH     • [place] SM[place] BX/EXC ISPI BRENDA IDRL XMED LUMBAR N/A 05/27/2020    Procedure: L1-2 and L4-5 laminectomy for resection of intradural extramedullary masses;  Surgeon: Anna Barker MD;  [facility];  Service: Neurosurgery   • SHOULDER SURGERY Left      Social History     Tobacco Use   • Smoking status: Never   • Smokeless tobacco: Never   Vaping Use   • Vaping Use: Never used   Substance Use Topics   • Alcohol use: Yes     Comment: 5 Drinks a week   • Drug use: No      Family History   Problem Relation Age of Onset   • Heart disease Mother    • Heart disease Father    • Heart disease Brother         Current Outpatient Medications:   •  Ascorbic Acid (VITAMIN C) 500 MG CAPS, Take by mouth daily as needed , Disp: , Rfl:   •  cetirizine (ZyrTEC) 10 mg tablet, Take 10 mg by mouth daily, Disp: , Rfl:   •  cholecalciferol (VITAMIN D3) 400 units tablet, Take 400 Units by mouth daily, Disp: , Rfl:   •  cyanocobalamin (VITAMIN B-12) 500 MCG tablet, Take 500 mcg by mouth daily, Disp: , Rfl:   •  fluticasone (FLONASE) 50 mcg/act nasal spray, 1 spray into each nostril as needed for rhinitis, Disp: , Rfl:   •  gabapentin (NEURONTIN) 300 mg capsule, , Disp: , Rfl:   •  latanoprost (XALATAN) 0 005 % ophthalmic solution, 3 (three) times a week , Disp: , Rfl: 4  •  Omega-3 Fatty Acids (Fish Oil) 500 MG CAPS, Take by mouth, Disp: , Rfl:   •  simvastatin (ZOCOR) 20 mg tablet, , Disp: , Rfl:   •  vitamin E 600 UNIT capsule, Take by mouth daily as needed, Disp: , Rfl:   •  acetaminophen (TYLENOL) 325 mg tablet, Take 3 tablets (975 mg total) by mouth every 8 (eight) hours (Patient not taking: Reported on 1/11/2022), Disp: 30 tablet, Rfl: 0  •  sildenafil (VIAGRA) 100 mg tablet, Take 1 tablet (100 mg total) by mouth daily as needed for erectile dysfunction (Patient not taking: Reported on 1/11/2022), Disp: 10 tablet, Rfl: 0     Allergies   Allergen Reactions   • Pollen Extract Other (See Comments)     Runny nose or congestion        The following portions of the patient's history were updated by MA and reviewed by MD: allergies, current medications, past family history, past medical history, past social history, past surgical history and problem list     Physical Exam  Vitals and nursing note reviewed  Constitutional:       General: He is not in acute distress  Appearance: Normal appearance  He is normal weight  He is not ill-appearing, toxic-appearing or diaphoretic  HENT:      Head: Normocephalic and atraumatic  Nose: Nose normal    Eyes:      Extraocular Movements: Extraocular movements intact  Pupils: Pupils are equal, round, and reactive to light  Musculoskeletal:         General: No swelling, tenderness, deformity or signs of injury  Normal range of motion  Cervical back: Normal range of motion and neck supple  Right lower leg: No edema  Left lower leg: No edema  Skin:     General: Skin is warm and dry  Neurological:      General: No focal deficit present        Mental Status: He is alert and oriented to person, place, and time  Mental status is at baseline  Cranial Nerves: No cranial nerve deficit  Sensory: No sensory deficit  Motor: No weakness  Coordination: Coordination normal       Gait: Gait abnormal (  He sidesteps after approximately 4 step)  Psychiatric:         Mood and Affect: Mood normal          Behavior: Behavior normal          Thought Content: Thought content normal          Judgment: Judgment normal          RESULTS/DATA  I have personally reviewed pertinent films in PACS     MRI of the LS spine is carefully reviewed  This is compared with previous studies  At T11 there is a small contrast-enhancing area which is a sign of a schwannoma which is known to be there and has not changed in size or position  It is not compressive  At the L4-5 region there is spondylosis and facet arthropathy which conspire to produce compression of the exiting nerve roots in this region  Although the facet arthropathy is quite severe and may be leading to his mechanical pain the compression of the nerve roots is not severe

## 2023-01-27 ENCOUNTER — EVALUATION (OUTPATIENT)
Dept: PHYSICAL THERAPY | Age: 78
End: 2023-01-27

## 2023-01-27 DIAGNOSIS — M47.816 SPONDYLOSIS WITHOUT MYELOPATHY OR RADICULOPATHY, LUMBAR REGION: Primary | ICD-10-CM

## 2023-01-27 NOTE — PROGRESS NOTES
PT Evaluation     Today's date: 2023  Patient name: Samantha Peck  :   MRN: [id number]  Referring provider: Bernie Boyd  Dx:   Encounter Diagnosis     ICD-10-CM    1  Spondylosis without myelopathy or radiculopathy, lumbar region  M47 816 Ambulatory referral to Physical Therapy                     Assessment  Assessment details: Pt is a 69 y o  male who presents to IE with chief c/o R sided low back pain ongoing for about a year  Signs and symptoms indicate probable diagnosis of lumbar spondylosis with flexion-bias  He has primary impairments of decreased lumbar ROM, decreased core stability, decreased LE strength, and increased pain  He is limited functionally as he has difficulty completing ADL's, participating in leisure activities, and completing work duties  He was provided with HEP for lumbar ROM and flexion-based exercises as well as flexibility exercises  Educated pt on proper completion of HEP, normal response to exercises, diagnosis, and POC  He verbalizes understanding of all education provided  All questions answered  Pt would benefit from skilled OP PT in order to improve upon impairments, maximize function, and achieve all goals     Impairments: abnormal or restricted ROM, impaired physical strength, lacks appropriate home exercise program, pain with function and poor posture   Understanding of Dx/Px/POC: good   Prognosis: good    Goals  Short term goals  Pt will improve strength by 1/2 grade in order to perform ADL's within 2-3 weeks   Pt will be able to bend down to  object off of the floor with a 50% reduction in symptoms within 2-3 weeks  Pt will be able to stand for >30 minutes with a 50% reduction in symptoms within 2-3 weeks  Pt will be able to sit for >30 minutes with a 50% reduction in symptoms    Long term goals  Pt will be independent with HEP and/or symptom management  Pt will return to PLOF and perform normal leisure activities with a 90% reduction in symptoms   Pt will be able to tolerate sitting for >1 hour without pain  Pt will be able to reach and lift overhead without pain in low back  Pt will improve FOTO >/= expected      Plan  Planned therapy interventions: patient education, therapeutic exercise, graded exercise, functional ROM exercises, flexibility, home exercise program, manual therapy, activity modification, strengthening, stretching, joint mobilization, massage, therapeutic activities and neuromuscular re-education  Frequency: 2x week  Duration in visits: 8  Duration in weeks: 4  Treatment plan discussed with: patient        Subjective Evaluation    History of Present Illness  Mechanism of injury: Pt sees Dr Caity Chávez  He had growths in his back about 2-3 years ago  The growths haven't come back  He does have R lower back he has arthritis  He wants him to get an epidural in his R sided low back but he needs PT before he can get the injection  He also said therapy may help the arthritis  He has had 2-3 previous injections in his low back the one helped but the other one did not  He has had surgery on L4-5 where they took the schwannoma's out  They were found to have been non-cancerous  He usually has discomfort when he is sitting or leaning toward his R side  And then getting up from sitting and when he is walking for the first 10-15 minutes  Leaning back and to the R side is most aggravating  If he switches positions it relieves the pain  No pain below the R sided low back  Pt sometimes has difficulty reaching because if he over-stretches it is not comfortable  He is retired but is going back to work soon  He does a lot of standing and sitting  Pain varies t/o the day depending on what he does  This has been bothering him for the past year and believes it has gotten a little bit worse     Pain  Current pain ratin  At best pain ratin  At worst pain ratin  Quality: pressure  Relieving factors: change in position  Aggravating factors: sitting  Progression: worsening    Treatments  Previous treatment: injection treatment  Current treatment: physical therapy  Patient Goals  Patient goal: Eliminate some of the pressure;         Objective     Active Range of Motion     Lumbar   Flexion:  Restriction level: moderate  Extension:  Restriction level: maximal  Left lateral flexion:  Restriction level: moderate  Right lateral flexion:  Restriction level: moderate  Left rotation:  Restriction level: moderate  Right rotation:  Restriction level: moderate    Additional Active Range of Motion Details  Lumbar AROM (% of normal AROM):  Flex: 75%  L lat flex: 50%  R lat flex: 50%  Ext: 25%  L rot: 50%  R rot: 50%      Strength/Myotome Testing     Left Hip   Planes of Motion   Flexion: 4  Abduction: 4  Adduction: 4    Right Hip   Planes of Motion   Flexion: 4  Abduction: 4  Adduction: 4    Left Knee   Flexion: 4  Extension: 4    Right Knee   Flexion: 4  Extension: 4    Left Ankle/Foot   Dorsiflexion: WFL  Plantar flexion: WFL  Right Ankle/Foot   Dorsiflexion: WFL  Plantar flexion: WFL  Tests     Lumbar     Left   Negative passive SLR and slump test      Right   Positive passive SLR and slump test      Left Pelvic Girdle/Sacrum   Negative: thigh thrust      Right Pelvic Girdle/Sacrum   Negative: thigh thrust      Left Hip   Negative LACIE and FADIR  Right Hip   Negative LACIE and FADIR                Precautions: Hx of lumbar schwannoma,       Manuals 1/27            H/S stretch             Piriformis stretch                                       Neuro Re-Ed                                                                                                        Ther Ex             HEP (SKTC, L/S rotation, H/S stretch) 10'             Bike             PPT w/ SLR flex             SLR abd             Dead bugs             TA ball press             DKTC             Repeated lumbar flex             Seated QL stretch w/ pb              Paloff press Sled push             Cybex machines?                                        Ther Activity             STS             Step ups             Gait Training                                       Modalities

## 2023-01-31 ENCOUNTER — OFFICE VISIT (OUTPATIENT)
Dept: PHYSICAL THERAPY | Age: 78
End: 2023-01-31

## 2023-01-31 DIAGNOSIS — M47.816 SPONDYLOSIS WITHOUT MYELOPATHY OR RADICULOPATHY, LUMBAR REGION: Primary | ICD-10-CM

## 2023-01-31 NOTE — PROGRESS NOTES
Daily Note     Today's date: 2023  Patient name: Hue Medina  :   MRN: [id number]  Referring provider: Bel Rainey  Dx:   Encounter Diagnosis     ICD-10-CM    1  Spondylosis without myelopathy or radiculopathy, lumbar region  M47 816                      Subjective: Pt states he feels like his back is better  He only really has trouble bending down to lift  Objective: See treatment diary below      Assessment: Initiated POC today with exercises to improve lumbar ROM, LE flexibility, and core and LE strength  Pt challenged by strengthening exercises  Requires cues for proper form during supine straight leg raise and side-lying straight leg raise to avoid compensatory movement patterns  No aggravation of symptoms t/o session  Tolerated treatment well  Patient demonstrated fatigue post treatment, exhibited good technique with therapeutic exercises and would benefit from continued PT      Plan: Continue per plan of care  Progress treatment as tolerated  Precautions: Hx of lumbar schwannoma,       Manuals            H/S stretch  Self w/ strap 30"x3           Piriformis stretch                                       Neuro Re-Ed                                                                                                        Ther Ex             HEP (SKTC, L/S rotation, H/S stretch) 10'             Bike  10'            H/S stretch  30"x3           PPT w/ SLR flex  3x10 b/l           SLR abd  3x10 b/l            Dead bugs             TA ball press  5"x30            DKTC  5"x15           Repeated lumbar flex  5"x30            Seated QL stretch w/ pb   5"x15            Paloff press  5"x15 ea 7 5#           Sled push  NT           Cybex machines?   NT                                     Ther Activity             STS             Step ups             Gait Training                                       Modalities

## 2023-02-02 ENCOUNTER — OFFICE VISIT (OUTPATIENT)
Dept: PHYSICAL THERAPY | Age: 78
End: 2023-02-02

## 2023-02-02 DIAGNOSIS — M47.816 SPONDYLOSIS WITHOUT MYELOPATHY OR RADICULOPATHY, LUMBAR REGION: Primary | ICD-10-CM

## 2023-02-02 NOTE — PROGRESS NOTES
Daily Note     Today's date: 2023  Patient name: Waylon Montenegro  :   MRN: [id number]  Referring provider: Sandra James  Dx:   Encounter Diagnosis     ICD-10-CM    1  Spondylosis without myelopathy or radiculopathy, lumbar region  M47 816                      Subjective: Pt states he is feeling good today  He did notice some tightness in his back and legs after last session  He did some of the stretches which helped  Objective: See treatment diary below      Assessment: Did not progress today's tx 2* pt's increased symptoms after last session  No aggravation of symptoms t/o today's session  Responds positively to flexion in supine and in sitting to reduce symptoms  Decreased flexibility in piriformis b/l with manual stretching  Tolerated treatment well  Patient demonstrated fatigue post treatment, exhibited good technique with therapeutic exercises and would benefit from continued PT      Plan: Continue per plan of care  Progress treatment as tolerated  Precautions: Hx of lumbar schwannoma,       Manuals  2/2          H/S stretch  Self w/ strap 30"x3           Piriformis stretch   30"x3 b/l                                     Neuro Re-Ed                                                                                                        Ther Ex             HEP (SKTC, L/S rotation, H/S stretch) 10'             Bike  10'  10'           H/S stretch  30"x3 30"x3          PPT w/ SLR flex  3x10 b/l 3x10 b/l           SLR abd  3x10 b/l  3x10 b/l           Dead bugs             TA ball press  5"x30  5"x30           DKTC  5"x15 5"x30           Repeated lumbar flex  5"x30  5"x30           Seated QL stretch w/ pb   5"x15  5"x15          Paloff press  5"x15 ea 7 5# 5"x15 ea 7 5#          Sled push  NT           Cybex machines?   NT                                     Ther Activity             STS             Step ups             Gait Training Modalities

## 2023-02-07 ENCOUNTER — APPOINTMENT (OUTPATIENT)
Dept: PHYSICAL THERAPY | Age: 78
End: 2023-02-07

## 2023-02-10 ENCOUNTER — APPOINTMENT (OUTPATIENT)
Dept: PHYSICAL THERAPY | Age: 78
End: 2023-02-10

## 2023-02-14 ENCOUNTER — APPOINTMENT (OUTPATIENT)
Dept: PHYSICAL THERAPY | Age: 78
End: 2023-02-14

## 2023-02-17 ENCOUNTER — APPOINTMENT (OUTPATIENT)
Dept: PHYSICAL THERAPY | Age: 78
End: 2023-02-17

## 2023-02-21 ENCOUNTER — APPOINTMENT (OUTPATIENT)
Dept: PHYSICAL THERAPY | Age: 78
End: 2023-02-21

## 2023-02-24 ENCOUNTER — APPOINTMENT (OUTPATIENT)
Dept: PHYSICAL THERAPY | Age: 78
End: 2023-02-24

## 2023-07-14 ENCOUNTER — OFFICE VISIT (OUTPATIENT)
Dept: OBGYN CLINIC | Facility: CLINIC | Age: 78
End: 2023-07-14
Payer: COMMERCIAL

## 2023-07-14 ENCOUNTER — HOSPITAL ENCOUNTER (OUTPATIENT)
Dept: RADIOLOGY | Facility: HOSPITAL | Age: 78
End: 2023-07-14
Attending: STUDENT IN AN ORGANIZED HEALTH CARE EDUCATION/TRAINING PROGRAM
Payer: COMMERCIAL

## 2023-07-14 VITALS
HEIGHT: 68 IN | HEART RATE: 71 BPM | SYSTOLIC BLOOD PRESSURE: 144 MMHG | BODY MASS INDEX: 26.98 KG/M2 | OXYGEN SATURATION: 94 % | WEIGHT: 178 LBS | DIASTOLIC BLOOD PRESSURE: 98 MMHG

## 2023-07-14 DIAGNOSIS — S61.201A OPEN WOUND OF LEFT INDEX FINGER: ICD-10-CM

## 2023-07-14 DIAGNOSIS — M79.645 PAIN OF FINGER OF LEFT HAND: ICD-10-CM

## 2023-07-14 DIAGNOSIS — L03.012 CELLULITIS OF LEFT RING FINGER: Primary | ICD-10-CM

## 2023-07-14 PROCEDURE — 99204 OFFICE O/P NEW MOD 45 MIN: CPT | Performed by: STUDENT IN AN ORGANIZED HEALTH CARE EDUCATION/TRAINING PROGRAM

## 2023-07-14 PROCEDURE — 73140 X-RAY EXAM OF FINGER(S): CPT

## 2023-07-14 RX ORDER — SULFAMETHOXAZOLE AND TRIMETHOPRIM 800; 160 MG/1; MG/1
TABLET ORAL
COMMUNITY
Start: 2023-07-12

## 2023-07-14 NOTE — PROGRESS NOTES
ORTHOPAEDIC HAND, WRIST, AND ELBOW OFFICE  VISIT       ASSESSMENT/PLAN:      Diagnoses and all orders for this visit:    Cellulitis of left ring finger  -     XR finger left fourth digit-ring; Future    Open wound of left index finger    Other orders  -     sulfamethoxazole-trimethoprim (BACTRIM DS) 800-160 mg per tablet          69 y.o. male with cellulitis of left ring finger with healing wound volar PIP joint w/o evidence of current infection, drainage. Treatment options and expected outcomes were discussed. Discussed with patient to continue with bactrim as instructed. Continue to wash with warm soap and water. Leave open to air unless he is going to perform activities which may allow debris to get into the wound. The wound will continue to gradually fill in from inside out. Discussed if he starts to experience increase in pain, swelling, redness, drainage, fever, chills then call for re evaluation or head to ED  The patient verbalized understanding of exam findings and treatment plan. The patient was given the opportunity to ask questions. Questions were answered to the patient's satisfaction. Follow Up: One week           Yaritza Lujan MD  Attending, Orthopaedic Surgery  Hand, Wrist, and Elbow Surgery  [facility]    ____________________________________________________________________________________________________________________________________________      CHIEF COMPLAINT:  Chief Complaint   Patient presents with   • Left Hand - New Patient Visit     Ring finger       SUBJECTIVE:  Levonia Dakins is a 69y.o. year old male RHD male who presents today at the request of Dr Haritha Melgar for evaluation and treatment of cellulitis of left ring finger. Patient cut ring finger on [facility] unit and was seen at urgent care [facility] 7/10/23. Injury 7/5/23. He was placed on doxycycline 100mg BID.  He then went back to express care 2 days later due to worsening symptoms swelling, redness, pain, discharge. He was then placed on bactrim. Since being on bactrim BID he has noticed redness, swelling decrease along with drainage. He has no active drainage today. He still notes some swelling and pain in finger joint worse when bending finger. Denies any fever,chills.            I have personally reviewed all the relevant PMH, PSH, SH, FH, Medications and allergies      PAST MEDICAL HISTORY:  Past Medical History:   Diagnosis Date   • Arthritis    • High cholesterol        PAST SURGICAL HISTORY:  Past Surgical History:   Procedure Laterality Date   • BACK SURGERY     • COLONOSCOPY     • HERNIA REPAIR     • INCISION AND DRAINAGE ABSCESS / HEMATOMA OF BURSA / [employer] / THIGH     • [place] SM[place] BX/EXC ISPI BRENDA IDRL XMED LUMBAR N/A 05/27/2020    Procedure: L1-2 and L4-5 laminectomy for resection of intradural extramedullary masses;  Surgeon: Tiff Serna MD;  [facility];  Service: Neurosurgery   • SHOULDER SURGERY Left        FAMILY HISTORY:  Family History   Problem Relation Age of Onset   • Heart disease Mother    • Heart disease Father    • Heart disease Brother        SOCIAL HISTORY:  Social History     Tobacco Use   • Smoking status: Never   • Smokeless tobacco: Never   Vaping Use   • Vaping Use: Never used   Substance Use Topics   • Alcohol use: Yes     Comment: 5 Drinks a week   • Drug use: No       MEDICATIONS:    Current Outpatient Medications:   •  Ascorbic Acid (VITAMIN C) 500 MG CAPS, Take by mouth daily as needed , Disp: , Rfl:   •  cetirizine (ZyrTEC) 10 mg tablet, Take 10 mg by mouth daily, Disp: , Rfl:   •  cholecalciferol (VITAMIN D3) 400 units tablet, Take 400 Units by mouth daily, Disp: , Rfl:   •  cyanocobalamin (VITAMIN B-12) 500 MCG tablet, Take 500 mcg by mouth daily, Disp: , Rfl:   •  fluticasone (FLONASE) 50 mcg/act nasal spray, 1 spray into each nostril as needed for rhinitis, Disp: , Rfl:   •  gabapentin (NEURONTIN) 300 mg capsule, , Disp: , Rfl:   •  latanoprost (XALATAN) 0.005 % ophthalmic solution, 3 (three) times a week , Disp: , Rfl: 4  •  Omega-3 Fatty Acids (Fish Oil) 500 MG CAPS, Take by mouth, Disp: , Rfl:   •  simvastatin (ZOCOR) 20 mg tablet, , Disp: , Rfl:   •  sulfamethoxazole-trimethoprim (BACTRIM DS) 800-160 mg per tablet, , Disp: , Rfl:   •  vitamin E 600 UNIT capsule, Take by mouth daily as needed, Disp: , Rfl:   •  acetaminophen (TYLENOL) 325 mg tablet, Take 3 tablets (975 mg total) by mouth every 8 (eight) hours (Patient not taking: Reported on 1/11/2022), Disp: 30 tablet, Rfl: 0  •  sildenafil (VIAGRA) 100 mg tablet, Take 1 tablet (100 mg total) by mouth daily as needed for erectile dysfunction (Patient not taking: Reported on 1/11/2022), Disp: 10 tablet, Rfl: 0    ALLERGIES:  Allergies   Allergen Reactions   • Pollen Extract Other (See Comments)     Runny nose or congestion           REVIEW OF SYSTEMS:  Musculoskeletal:        As noted in HPI. All other systems reviewed and are negative.     VITALS:  Vitals:    07/14/23 1455   BP: 144/98   Pulse: 71   SpO2: 94%       LABS:  HgA1c:   Lab Results   Component Value Date    HGBA1C 5.7 (H) 08/09/2022     BMP:   Lab Results   Component Value Date    CALCIUM 8.5 05/19/2020    K 3.9 05/19/2020    CO2 28 05/19/2020     05/19/2020    BUN 17 05/19/2020    CREATININE 0.95 05/19/2020       _____________________________________________________  PHYSICAL EXAMINATION:  General: well developed and well nourished, alert, oriented times 3 and appears comfortable  Psychiatric: Normal  HEENT: Normocephalic, Atraumatic Trachea Midline, No torticollis  Pulmonary: No audible wheezing or respiratory distress   Abdomen/GI: Non tender, non distended   Cardiovascular: No pitting edema, 2+ radial pulse   Skin: No masses, erythema, lacerations, fluctation, ulcerations  Neurovascular: Sensation Intact to the Median, Ulnar, Radial Nerve, Motor Intact to the Median, Ulnar, Radial Nerve and Pulses Intact  Musculoskeletal: Normal, except as noted in detailed exam and in HPI.       MUSCULOSKELETAL EXAMINATION:  Left ring finger  Mild swelling and erythema PIP joint   TTP volar PIP joint with open wound radial aspect with out evidence of drainage, evidence of granular healing   Able to fully extend finger, actively able to flex finger  Able to make composite fist      ___________________________________________________  STUDIES REVIEWED:  Xrays of the left ring finger were reviewed and independently interpreted in PACS by Dr. Lupe Marcum and demonstrate no fracture, dislocation, significant degenerative changes         PROCEDURES PERFORMED:  Procedures      _____________________________________________________      [facility]    I,:  Leanne Armstrong am acting as a scribe while in the presence of the attending physician.:       I,:  Marisa Perez MD personally performed the services described in this documentation    as scribed in my presence.:

## 2023-07-17 ENCOUNTER — TELEPHONE (OUTPATIENT)
Dept: OBGYN CLINIC | Facility: CLINIC | Age: 78
End: 2023-07-17

## 2023-07-17 NOTE — TELEPHONE ENCOUNTER
----- Message from Yvonne William sent at 7/15/2023  7:56 AM EDT -----  Regarding: Please schedule  Good Morning,     Please reach to patient for a follow up appointment on 7/21 per dr Lupe Marcum     Return in about 1 week (around 7/21/2023).  left index finger     [phone]    Thank you

## 2025-06-05 ENCOUNTER — ESTABLISHED COMPREHENSIVE EXAM (OUTPATIENT)
Dept: URBAN - METROPOLITAN AREA CLINIC 6 | Facility: CLINIC | Age: 80
End: 2025-06-05

## 2025-06-05 DIAGNOSIS — H43.813: ICD-10-CM

## 2025-06-05 DIAGNOSIS — H25.813: ICD-10-CM

## 2025-06-05 DIAGNOSIS — H04.123: ICD-10-CM

## 2025-06-05 PROCEDURE — 92012 INTRM OPH EXAM EST PATIENT: CPT

## 2025-06-05 PROCEDURE — 92136 OPHTHALMIC BIOMETRY: CPT

## 2025-06-05 ASSESSMENT — VISUAL ACUITY
OD_GLARE: 20/60-1
OS_GLARE: 20/70
OD_CC: 20/50
OS_CC: 20/60

## 2025-06-05 ASSESSMENT — TONOMETRY
OS_IOP_MMHG: 23
OD_IOP_MMHG: 23

## 2025-06-05 ASSESSMENT — KERATOMETRY
OD_AXISANGLE2_DEGREES: 34
OD_K2POWER_DIOPTERS: 39.75
OD_K1POWER_DIOPTERS: 38.75
OS_K1POWER_DIOPTERS: 39.25
OS_AXISANGLE2_DEGREES: 91
OS_AXISANGLE_DEGREES: 001
OD_AXISANGLE_DEGREES: 124
OS_K2POWER_DIOPTERS: 39.75

## 2025-06-18 ENCOUNTER — 1 DAY POST-OP (OUTPATIENT)
Dept: URBAN - METROPOLITAN AREA CLINIC 6 | Facility: CLINIC | Age: 80
End: 2025-06-18

## 2025-06-18 DIAGNOSIS — H25.812: ICD-10-CM

## 2025-06-18 DIAGNOSIS — Z96.1: ICD-10-CM

## 2025-06-18 PROCEDURE — 92136 OPHTHALMIC BIOMETRY: CPT | Mod: 26,LT

## 2025-06-18 PROCEDURE — 99024 POSTOP FOLLOW-UP VISIT: CPT

## 2025-06-18 ASSESSMENT — KERATOMETRY
OD_K2POWER_DIOPTERS: 39.75
OD_AXISANGLE_DEGREES: 124
OD_K1POWER_DIOPTERS: 38.75
OS_K2POWER_DIOPTERS: 39.75
OS_AXISANGLE_DEGREES: 001
OS_K1POWER_DIOPTERS: 39.25
OD_AXISANGLE2_DEGREES: 34
OS_AXISANGLE2_DEGREES: 91

## 2025-06-18 ASSESSMENT — VISUAL ACUITY
OD_SC: 20/40+2
OS_CC: 20/60

## 2025-06-18 ASSESSMENT — TONOMETRY
OS_IOP_MMHG: 16
OD_IOP_MMHG: 32

## 2025-06-19 ENCOUNTER — PROBLEM (OUTPATIENT)
Dept: URBAN - METROPOLITAN AREA CLINIC 6 | Facility: CLINIC | Age: 80
End: 2025-06-19

## 2025-06-19 DIAGNOSIS — Z96.1: ICD-10-CM

## 2025-06-19 PROCEDURE — 99024 POSTOP FOLLOW-UP VISIT: CPT

## 2025-06-19 ASSESSMENT — KERATOMETRY
OS_K2POWER_DIOPTERS: 39.75
OD_AXISANGLE_DEGREES: 124
OS_AXISANGLE_DEGREES: 001
OD_K2POWER_DIOPTERS: 39.75
OS_K1POWER_DIOPTERS: 39.25
OD_K1POWER_DIOPTERS: 38.75
OD_AXISANGLE2_DEGREES: 34
OS_AXISANGLE2_DEGREES: 91

## 2025-06-19 ASSESSMENT — TONOMETRY
OD_IOP_MMHG: 12
OS_IOP_MMHG: 18

## 2025-06-19 ASSESSMENT — VISUAL ACUITY: OD_SC: 20/60

## 2025-07-02 ENCOUNTER — 1 DAY POST-OP (OUTPATIENT)
Dept: URBAN - METROPOLITAN AREA CLINIC 6 | Facility: CLINIC | Age: 80
End: 2025-07-02

## 2025-07-02 DIAGNOSIS — Z96.1: ICD-10-CM

## 2025-07-02 PROCEDURE — 99024 POSTOP FOLLOW-UP VISIT: CPT

## 2025-07-02 ASSESSMENT — VISUAL ACUITY
OS_SC: 20/30-1
OD_PH: 20/80-2
OD_SC: 20/150+1

## 2025-07-02 ASSESSMENT — KERATOMETRY
OD_AXISANGLE_DEGREES: 124
OD_K1POWER_DIOPTERS: 38.75
OS_AXISANGLE_DEGREES: 001
OS_K1POWER_DIOPTERS: 39.25
OD_K2POWER_DIOPTERS: 39.75
OS_K2POWER_DIOPTERS: 39.75
OS_AXISANGLE2_DEGREES: 91
OD_AXISANGLE2_DEGREES: 34

## 2025-07-02 ASSESSMENT — TONOMETRY
OD_IOP_MMHG: 13
OS_IOP_MMHG: 20

## 2025-07-18 ENCOUNTER — POST-OP CHECK (OUTPATIENT)
Dept: URBAN - METROPOLITAN AREA CLINIC 6 | Facility: CLINIC | Age: 80
End: 2025-07-18

## 2025-07-18 DIAGNOSIS — H35.372: ICD-10-CM

## 2025-07-18 DIAGNOSIS — Z96.1: ICD-10-CM

## 2025-07-18 PROCEDURE — 99024 POSTOP FOLLOW-UP VISIT: CPT

## 2025-07-18 ASSESSMENT — VISUAL ACUITY
OD_PH: 20/100
OS_SC: 20/30
OD_SC: 20/200+1

## 2025-07-18 ASSESSMENT — KERATOMETRY
OS_K1POWER_DIOPTERS: 39.25
OD_AXISANGLE2_DEGREES: 34
OD_K1POWER_DIOPTERS: 38.75
OS_AXISANGLE2_DEGREES: 91
OS_K2POWER_DIOPTERS: 39.75
OD_AXISANGLE_DEGREES: 124
OS_AXISANGLE_DEGREES: 001
OD_K2POWER_DIOPTERS: 39.75

## 2025-07-18 ASSESSMENT — TONOMETRY
OD_IOP_MMHG: 17
OS_IOP_MMHG: 20

## 2025-08-15 ENCOUNTER — POST-OP CHECK (OUTPATIENT)
Dept: URBAN - METROPOLITAN AREA CLINIC 6 | Facility: CLINIC | Age: 80
End: 2025-08-15

## 2025-08-15 DIAGNOSIS — Z96.1: ICD-10-CM

## 2025-08-15 DIAGNOSIS — H04.123: ICD-10-CM

## 2025-08-15 DIAGNOSIS — H35.372: ICD-10-CM

## 2025-08-15 DIAGNOSIS — H35.341: ICD-10-CM

## 2025-08-15 PROCEDURE — 92015 DETERMINE REFRACTIVE STATE: CPT

## 2025-08-15 PROCEDURE — 99024 POSTOP FOLLOW-UP VISIT: CPT

## 2025-08-15 PROCEDURE — 92134 CPTRZ OPH DX IMG PST SGM RTA: CPT

## 2025-08-15 ASSESSMENT — KERATOMETRY
OS_K2POWER_DIOPTERS: 39.75
OS_K1POWER_DIOPTERS: 39.25
OS_AXISANGLE2_DEGREES: 91
OS_AXISANGLE_DEGREES: 001
OD_AXISANGLE2_DEGREES: 34
OD_K2POWER_DIOPTERS: 39.75
OD_K1POWER_DIOPTERS: 38.75
OD_AXISANGLE_DEGREES: 124

## 2025-08-15 ASSESSMENT — VISUAL ACUITY
OS_SC: 20/20
OD_PH: 20/60+2
OD_SC: 20/80

## 2025-08-15 ASSESSMENT — TONOMETRY
OD_IOP_MMHG: 13
OS_IOP_MMHG: 16

## (undated) DEVICE — DRAPE SHEET X-LG

## (undated) DEVICE — NEURO PATTIES 1/2 X 3

## (undated) DEVICE — INTENDED FOR TISSUE SEPARATION, AND OTHER PROCEDURES THAT REQUIRE A SHARP SURGICAL BLADE TO PUNCTURE OR CUT.: Brand: BARD-PARKER ® CARBON RIB-BACK BLADES

## (undated) DEVICE — PLUMEPEN PRO 10FT

## (undated) DEVICE — SUT NUROLON 4-0 TF CR/8 18 IN C584D

## (undated) DEVICE — SPECIMEN CONTAINER STERILE PEEL PACK

## (undated) DEVICE — 3M™ TEGADERM™ TRANSPARENT FILM DRESSING FRAME STYLE, 1626W, 4 IN X 4-3/4 IN (10 CM X 12 CM), 50/CT 4CT/CASE: Brand: 3M™ TEGADERM™

## (undated) DEVICE — GLOVE INDICATOR PI UNDERGLOVE SZ 8.5 BLUE

## (undated) DEVICE — NEURO PATTIES 1/4 X 3

## (undated) DEVICE — INTENDED FOR TISSUE SEPARATION, AND OTHER PROCEDURES THAT REQUIRE A SHARP SURGICAL BLADE TO PUNCTURE OR CUT.: Brand: BARD-PARKER SAFETY BLADES SIZE 15, STERILE

## (undated) DEVICE — DURASEAL® EXACT SPINAL SEALANT SYSTEM 3ML 5 PACK: Brand: DURASEAL EXACT SPINAL SEALANT SYSTEM

## (undated) DEVICE — GLOVE SRG BIOGEL 8

## (undated) DEVICE — BIPOLAR SEALER 23-113-1 AQM 2.3: Brand: AQUAMANTYS™

## (undated) DEVICE — ADHESIVE SKIN HIGH VISCOSITY EXOFIN 1ML

## (undated) DEVICE — SUPPLY FEE STD

## (undated) DEVICE — TRAY FOLEY 16FR URIMETER SURESTEP

## (undated) DEVICE — TIBURON SPLIT SHEET: Brand: CONVERTORS

## (undated) DEVICE — SUT MONOCRYL PLUS 4-0 PS-2 18 IN MCP496G

## (undated) DEVICE — SOFT SILICONE HYDROCELLULAR SACRUM DRESSING WITH LOCK AWAY LAYER: Brand: ALLEVYN LIFE SACRUM (LARGE) PACK OF 10

## (undated) DEVICE — DISPOSABLE EQUIPMENT COVER: Brand: SMALL TOWEL DRAPE

## (undated) DEVICE — SNAP KOVER: Brand: UNBRANDED

## (undated) DEVICE — ANTIBACTERIAL VIOLET BRAIDED (POLYGLACTIN 910), SYNTHETIC ABSORBABLE SUTURE: Brand: COATED VICRYL

## (undated) DEVICE — DRESSING MEPILEX AG BORDER 4 X 8 IN

## (undated) DEVICE — ELECTRODE BLADE E-Z CLEAN 4IN -0014A

## (undated) DEVICE — BETHLEHEM UNIVERSAL SPINE, KIT: Brand: CARDINAL HEALTH

## (undated) DEVICE — SURGIFOAM 8.5 X 12.5

## (undated) DEVICE — INTEGRATED TUBING AND BIPOLAR CORD SET, DISPOSABLE

## (undated) DEVICE — DRAPE MICROSCOPE OPMI PENTERO

## (undated) DEVICE — NEURO PATTIES 1/4 X 1/4

## (undated) DEVICE — DURASEAL MICROMYST APPLICATOR TIP

## (undated) DEVICE — SPONGE PVP SCRUB WING STERILE

## (undated) DEVICE — HEMOSTATIC MATRIX SURGIFLO 8ML W/THROMBIN

## (undated) DEVICE — SYRINGE 10ML SLIP TIP LF

## (undated) DEVICE — TOOL 14MH30 LEGEND 14CM 3MM: Brand: MIDAS REX ™

## (undated) DEVICE — TELFA NON-ADHERENT ABSORBENT DRESSING: Brand: TELFA

## (undated) DEVICE — MONITORING SPINAL IMPULSE CASE FEE

## (undated) RX ORDER — TIMOLOL MALEATE 5 MG/ML: 1 SOLUTION OPHTHALMIC TWICE A DAY